# Patient Record
Sex: MALE | Race: WHITE | NOT HISPANIC OR LATINO
[De-identification: names, ages, dates, MRNs, and addresses within clinical notes are randomized per-mention and may not be internally consistent; named-entity substitution may affect disease eponyms.]

---

## 2017-02-05 ENCOUNTER — TRANSCRIPTION ENCOUNTER (OUTPATIENT)
Age: 77
End: 2017-02-05

## 2017-10-04 ENCOUNTER — EMERGENCY (EMERGENCY)
Facility: HOSPITAL | Age: 77
LOS: 1 days | Discharge: HOME | End: 2017-10-04
Attending: EMERGENCY MEDICINE | Admitting: EMERGENCY MEDICINE
Payer: MEDICARE

## 2017-10-04 VITALS
SYSTOLIC BLOOD PRESSURE: 127 MMHG | HEART RATE: 78 BPM | RESPIRATION RATE: 18 BRPM | DIASTOLIC BLOOD PRESSURE: 81 MMHG | OXYGEN SATURATION: 99 % | TEMPERATURE: 98 F

## 2017-10-04 DIAGNOSIS — Z88.1 ALLERGY STATUS TO OTHER ANTIBIOTIC AGENTS STATUS: ICD-10-CM

## 2017-10-04 DIAGNOSIS — S90.562A INSECT BITE (NONVENOMOUS), LEFT ANKLE, INITIAL ENCOUNTER: ICD-10-CM

## 2017-10-04 PROCEDURE — 99282 EMERGENCY DEPT VISIT SF MDM: CPT

## 2017-10-04 NOTE — ED PROVIDER NOTE - MEDICAL DECISION MAKING DETAILS
insect bite to anterior ankle left, itchy, no pain, today, will Rx bacitracin and recommend no scratching and follow up, return if not improving

## 2017-10-04 NOTE — ED PROVIDER NOTE - OBJECTIVE STATEMENT
insect bite to ankle, left, x 1 day, patient has window screens but they do not cover the whole window

## 2019-03-02 ENCOUNTER — EMERGENCY (EMERGENCY)
Facility: HOSPITAL | Age: 79
LOS: 1 days | Discharge: HOME | End: 2019-03-02
Admitting: EMERGENCY MEDICINE
Payer: MEDICARE

## 2019-03-02 VITALS
DIASTOLIC BLOOD PRESSURE: 71 MMHG | OXYGEN SATURATION: 98 % | SYSTOLIC BLOOD PRESSURE: 161 MMHG | HEART RATE: 76 BPM | RESPIRATION RATE: 18 BRPM | TEMPERATURE: 98 F

## 2019-03-02 VITALS
TEMPERATURE: 98 F | OXYGEN SATURATION: 98 % | RESPIRATION RATE: 18 BRPM | SYSTOLIC BLOOD PRESSURE: 149 MMHG | HEART RATE: 61 BPM | DIASTOLIC BLOOD PRESSURE: 68 MMHG

## 2019-03-02 DIAGNOSIS — R25.2 CRAMP AND SPASM: ICD-10-CM

## 2019-03-02 LAB
ALBUMIN SERPL ELPH-MCNC: 3.4 G/DL — SIGNIFICANT CHANGE UP (ref 3.4–5)
ALP SERPL-CCNC: 93 U/L — SIGNIFICANT CHANGE UP (ref 40–120)
ALT FLD-CCNC: 25 U/L — SIGNIFICANT CHANGE UP (ref 12–42)
ANION GAP SERPL CALC-SCNC: 6 MMOL/L — LOW (ref 9–16)
AST SERPL-CCNC: 22 U/L — SIGNIFICANT CHANGE UP (ref 15–37)
BASOPHILS NFR BLD AUTO: 0.8 % — SIGNIFICANT CHANGE UP (ref 0–2)
BILIRUB SERPL-MCNC: 0.7 MG/DL — SIGNIFICANT CHANGE UP (ref 0.2–1.2)
BUN SERPL-MCNC: 33 MG/DL — HIGH (ref 7–23)
CALCIUM SERPL-MCNC: 9.4 MG/DL — SIGNIFICANT CHANGE UP (ref 8.5–10.5)
CHLORIDE SERPL-SCNC: 105 MMOL/L — SIGNIFICANT CHANGE UP (ref 96–108)
CO2 SERPL-SCNC: 26 MMOL/L — SIGNIFICANT CHANGE UP (ref 22–31)
CREAT SERPL-MCNC: 1.05 MG/DL — SIGNIFICANT CHANGE UP (ref 0.5–1.3)
EOSINOPHIL NFR BLD AUTO: 4.4 % — SIGNIFICANT CHANGE UP (ref 0–6)
GLUCOSE SERPL-MCNC: 101 MG/DL — HIGH (ref 70–99)
HCT VFR BLD CALC: 39.3 % — SIGNIFICANT CHANGE UP (ref 39–50)
HGB BLD-MCNC: 12.5 G/DL — LOW (ref 13–17)
IMM GRANULOCYTES NFR BLD AUTO: 0.2 % — SIGNIFICANT CHANGE UP (ref 0–1.5)
LYMPHOCYTES # BLD AUTO: 11.6 % — LOW (ref 13–44)
MAGNESIUM SERPL-MCNC: 2.1 MG/DL — SIGNIFICANT CHANGE UP (ref 1.6–2.6)
MCHC RBC-ENTMCNC: 30.3 PG — SIGNIFICANT CHANGE UP (ref 27–34)
MCHC RBC-ENTMCNC: 31.8 G/DL — LOW (ref 32–36)
MCV RBC AUTO: 95.4 FL — SIGNIFICANT CHANGE UP (ref 80–100)
MONOCYTES NFR BLD AUTO: 12 % — SIGNIFICANT CHANGE UP (ref 2–14)
NEUTROPHILS NFR BLD AUTO: 71 % — SIGNIFICANT CHANGE UP (ref 43–77)
PLATELET # BLD AUTO: 208 K/UL — SIGNIFICANT CHANGE UP (ref 150–400)
POTASSIUM SERPL-MCNC: 4.4 MMOL/L — SIGNIFICANT CHANGE UP (ref 3.5–5.3)
POTASSIUM SERPL-SCNC: 4.4 MMOL/L — SIGNIFICANT CHANGE UP (ref 3.5–5.3)
PROT SERPL-MCNC: 6.8 G/DL — SIGNIFICANT CHANGE UP (ref 6.4–8.2)
RBC # BLD: 4.12 M/UL — LOW (ref 4.2–5.8)
RBC # FLD: 14.8 % — HIGH (ref 10.3–14.5)
SODIUM SERPL-SCNC: 137 MMOL/L — SIGNIFICANT CHANGE UP (ref 132–145)
WBC # BLD: 4.8 K/UL — SIGNIFICANT CHANGE UP (ref 3.8–10.5)
WBC # FLD AUTO: 4.8 K/UL — SIGNIFICANT CHANGE UP (ref 3.8–10.5)

## 2019-03-02 PROCEDURE — 99284 EMERGENCY DEPT VISIT MOD MDM: CPT

## 2019-03-02 RX ORDER — DIAZEPAM 5 MG
5 TABLET ORAL ONCE
Qty: 0 | Refills: 0 | Status: DISCONTINUED | OUTPATIENT
Start: 2019-03-02 | End: 2019-03-02

## 2019-03-02 RX ORDER — SODIUM CHLORIDE 9 MG/ML
1000 INJECTION INTRAMUSCULAR; INTRAVENOUS; SUBCUTANEOUS ONCE
Qty: 0 | Refills: 0 | Status: COMPLETED | OUTPATIENT
Start: 2019-03-02 | End: 2019-03-02

## 2019-03-02 RX ADMIN — SODIUM CHLORIDE 1000 MILLILITER(S): 9 INJECTION INTRAMUSCULAR; INTRAVENOUS; SUBCUTANEOUS at 15:54

## 2019-03-02 NOTE — ED PROVIDER NOTE - CLINICAL SUMMARY MEDICAL DECISION MAKING FREE TEXT BOX
pt. with muscle b/l LE muscle cramps, vss, no risk for electrolyte abnormality, vss, labs wnl. advised  to hydrate, and f/u with pmd.

## 2019-03-02 NOTE — ED ADULT NURSE NOTE - CHPI ED NUR SYMPTOMS NEG
no chills/no dizziness/no pain/no vomiting/no decreased eating/drinking/no nausea/no fever/no tingling/no weakness

## 2019-03-02 NOTE — ED PROVIDER NOTE - OBJECTIVE STATEMENT
79 yo M w/ history of alcohol abuse (currently sober, in AA) c/o b/l LE muscle cramping today. Pt notes prior episodes that resolve on its own, but states today's episode was more intense. Pt notes he drank a lot caffeinated tea last night; denies increased urination. No numbness/tingling, abrasion, swelling, redness. 77 yo M w/ history of alcohol abuse (currently sober, in AA) c/o b/l LE muscle cramping today. Pt notes prior episodes that resolve on its own, but states today's episode was more intense. Pt notes he drank a lot caffeinated tea last night; denies increased urination. No numbness/tingling, abrasion, swelling, redness.    Medications: flomax 77 yo M w/ history of alcohol abuse (currently sober, in AA) c/o b/l LE muscle cramping today. Pt notes prior episodes that resolve on its own, but states today's episode was more intense. Pt notes he drank a lot caffeinated tea last night; denies increased urination. No numbness/tingling,no N/v, no CP/SOB, no fever/chills.  abrasion, swelling, redness.    Medications: flomax

## 2019-03-02 NOTE — ED ADULT NURSE NOTE - OBJECTIVE STATEMENT
78 y.o M presents to ED with c/o bilateral lower leg/calf cramping this morning. PT reports pain has subsided. Denies any CP or SOB.

## 2019-03-06 DIAGNOSIS — M79.661 PAIN IN RIGHT LOWER LEG: ICD-10-CM

## 2019-03-06 DIAGNOSIS — M79.662 PAIN IN LEFT LOWER LEG: ICD-10-CM

## 2019-03-06 DIAGNOSIS — Z87.891 PERSONAL HISTORY OF NICOTINE DEPENDENCE: ICD-10-CM

## 2019-03-06 DIAGNOSIS — Z88.1 ALLERGY STATUS TO OTHER ANTIBIOTIC AGENTS STATUS: ICD-10-CM

## 2019-03-13 ENCOUNTER — EMERGENCY (EMERGENCY)
Facility: HOSPITAL | Age: 79
LOS: 1 days | Discharge: HOME | End: 2019-03-13
Attending: EMERGENCY MEDICINE | Admitting: EMERGENCY MEDICINE
Payer: MEDICARE

## 2019-03-13 VITALS
SYSTOLIC BLOOD PRESSURE: 179 MMHG | RESPIRATION RATE: 16 BRPM | TEMPERATURE: 98 F | OXYGEN SATURATION: 97 % | DIASTOLIC BLOOD PRESSURE: 78 MMHG | HEART RATE: 72 BPM

## 2019-03-13 PROCEDURE — 99284 EMERGENCY DEPT VISIT MOD MDM: CPT | Mod: 25

## 2019-03-13 PROCEDURE — 93010 ELECTROCARDIOGRAM REPORT: CPT

## 2019-03-14 ENCOUNTER — EMERGENCY (EMERGENCY)
Facility: HOSPITAL | Age: 79
LOS: 1 days | Discharge: HOME | End: 2019-03-14
Attending: EMERGENCY MEDICINE | Admitting: EMERGENCY MEDICINE
Payer: MEDICARE

## 2019-03-14 VITALS
OXYGEN SATURATION: 99 % | DIASTOLIC BLOOD PRESSURE: 76 MMHG | HEART RATE: 76 BPM | RESPIRATION RATE: 17 BRPM | SYSTOLIC BLOOD PRESSURE: 163 MMHG | TEMPERATURE: 98 F

## 2019-03-14 VITALS
OXYGEN SATURATION: 97 % | DIASTOLIC BLOOD PRESSURE: 76 MMHG | RESPIRATION RATE: 18 BRPM | TEMPERATURE: 98 F | SYSTOLIC BLOOD PRESSURE: 161 MMHG | HEART RATE: 69 BPM

## 2019-03-14 DIAGNOSIS — Z88.8 ALLERGY STATUS TO OTHER DRUGS, MEDICAMENTS AND BIOLOGICAL SUBSTANCES STATUS: ICD-10-CM

## 2019-03-14 DIAGNOSIS — M79.661 PAIN IN RIGHT LOWER LEG: ICD-10-CM

## 2019-03-14 DIAGNOSIS — Z79.899 OTHER LONG TERM (CURRENT) DRUG THERAPY: ICD-10-CM

## 2019-03-14 DIAGNOSIS — R25.2 CRAMP AND SPASM: ICD-10-CM

## 2019-03-14 PROBLEM — F10.21 ALCOHOL DEPENDENCE, IN REMISSION: Chronic | Status: ACTIVE | Noted: 2019-03-02

## 2019-03-14 LAB
ALBUMIN SERPL ELPH-MCNC: 3 G/DL — LOW (ref 3.4–5)
ALP SERPL-CCNC: 81 U/L — SIGNIFICANT CHANGE UP (ref 40–120)
ALT FLD-CCNC: 24 U/L — SIGNIFICANT CHANGE UP (ref 12–42)
ANION GAP SERPL CALC-SCNC: 7 MMOL/L — LOW (ref 9–16)
AST SERPL-CCNC: 22 U/L — SIGNIFICANT CHANGE UP (ref 15–37)
BASOPHILS NFR BLD AUTO: 0.5 % — SIGNIFICANT CHANGE UP (ref 0–2)
BILIRUB SERPL-MCNC: 0.4 MG/DL — SIGNIFICANT CHANGE UP (ref 0.2–1.2)
BUN SERPL-MCNC: 29 MG/DL — HIGH (ref 7–23)
CALCIUM SERPL-MCNC: 9.3 MG/DL — SIGNIFICANT CHANGE UP (ref 8.5–10.5)
CHLORIDE SERPL-SCNC: 107 MMOL/L — SIGNIFICANT CHANGE UP (ref 96–108)
CK SERPL-CCNC: 94 U/L — SIGNIFICANT CHANGE UP (ref 39–308)
CO2 SERPL-SCNC: 29 MMOL/L — SIGNIFICANT CHANGE UP (ref 22–31)
CREAT SERPL-MCNC: 1.04 MG/DL — SIGNIFICANT CHANGE UP (ref 0.5–1.3)
D DIMER BLD IA.RAPID-MCNC: 245 NG/ML DDU — HIGH
EOSINOPHIL NFR BLD AUTO: 3.8 % — SIGNIFICANT CHANGE UP (ref 0–6)
GLUCOSE SERPL-MCNC: 102 MG/DL — HIGH (ref 70–99)
HCT VFR BLD CALC: 35.3 % — LOW (ref 39–50)
HGB BLD-MCNC: 11.5 G/DL — LOW (ref 13–17)
IMM GRANULOCYTES NFR BLD AUTO: 0.2 % — SIGNIFICANT CHANGE UP (ref 0–1.5)
LYMPHOCYTES # BLD AUTO: 11.1 % — LOW (ref 13–44)
MAGNESIUM SERPL-MCNC: 2.1 MG/DL — SIGNIFICANT CHANGE UP (ref 1.6–2.6)
MCHC RBC-ENTMCNC: 30.9 PG — SIGNIFICANT CHANGE UP (ref 27–34)
MCHC RBC-ENTMCNC: 32.6 G/DL — SIGNIFICANT CHANGE UP (ref 32–36)
MCV RBC AUTO: 94.9 FL — SIGNIFICANT CHANGE UP (ref 80–100)
MONOCYTES NFR BLD AUTO: 10.4 % — SIGNIFICANT CHANGE UP (ref 2–14)
NEUTROPHILS NFR BLD AUTO: 74 % — SIGNIFICANT CHANGE UP (ref 43–77)
PLATELET # BLD AUTO: 162 K/UL — SIGNIFICANT CHANGE UP (ref 150–400)
POTASSIUM SERPL-MCNC: 4.5 MMOL/L — SIGNIFICANT CHANGE UP (ref 3.5–5.3)
POTASSIUM SERPL-SCNC: 4.5 MMOL/L — SIGNIFICANT CHANGE UP (ref 3.5–5.3)
PROT SERPL-MCNC: 6.7 G/DL — SIGNIFICANT CHANGE UP (ref 6.4–8.2)
RBC # BLD: 3.72 M/UL — LOW (ref 4.2–5.8)
RBC # FLD: 14.8 % — HIGH (ref 10.3–14.5)
SODIUM SERPL-SCNC: 143 MMOL/L — SIGNIFICANT CHANGE UP (ref 132–145)
TROPONIN I SERPL-MCNC: <0.017 NG/ML — LOW (ref 0.02–0.06)
TSH SERPL-MCNC: 1.21 UIU/ML — SIGNIFICANT CHANGE UP (ref 0.36–3.74)
WBC # BLD: 4.4 K/UL — SIGNIFICANT CHANGE UP (ref 3.8–10.5)
WBC # FLD AUTO: 4.4 K/UL — SIGNIFICANT CHANGE UP (ref 3.8–10.5)

## 2019-03-14 PROCEDURE — 93970 EXTREMITY STUDY: CPT | Mod: 26

## 2019-03-14 PROCEDURE — 99284 EMERGENCY DEPT VISIT MOD MDM: CPT

## 2019-03-14 RX ORDER — SODIUM CHLORIDE 9 MG/ML
500 INJECTION INTRAMUSCULAR; INTRAVENOUS; SUBCUTANEOUS ONCE
Qty: 0 | Refills: 0 | Status: COMPLETED | OUTPATIENT
Start: 2019-03-14 | End: 2019-03-14

## 2019-03-14 RX ORDER — CYCLOBENZAPRINE HYDROCHLORIDE 10 MG/1
10 TABLET, FILM COATED ORAL ONCE
Qty: 0 | Refills: 0 | Status: COMPLETED | OUTPATIENT
Start: 2019-03-14 | End: 2019-03-14

## 2019-03-14 RX ORDER — ASPIRIN/CALCIUM CARB/MAGNESIUM 324 MG
162 TABLET ORAL ONCE
Qty: 0 | Refills: 0 | Status: COMPLETED | OUTPATIENT
Start: 2019-03-14 | End: 2019-03-14

## 2019-03-14 RX ORDER — THIAMINE MONONITRATE (VIT B1) 100 MG
1 TABLET ORAL
Qty: 30 | Refills: 0 | OUTPATIENT
Start: 2019-03-14 | End: 2019-04-12

## 2019-03-14 RX ORDER — FOLIC ACID 0.8 MG
1 TABLET ORAL DAILY
Qty: 0 | Refills: 0 | Status: DISCONTINUED | OUTPATIENT
Start: 2019-03-14 | End: 2019-03-17

## 2019-03-14 RX ORDER — ASPIRIN/CALCIUM CARB/MAGNESIUM 324 MG
162 TABLET ORAL ONCE
Qty: 0 | Refills: 0 | Status: DISCONTINUED | OUTPATIENT
Start: 2019-03-14 | End: 2019-03-14

## 2019-03-14 RX ORDER — FOLIC ACID 0.8 MG
1 TABLET ORAL
Qty: 30 | Refills: 0
Start: 2019-03-14 | End: 2019-04-12

## 2019-03-14 RX ORDER — THIAMINE MONONITRATE (VIT B1) 100 MG
100 TABLET ORAL ONCE
Qty: 0 | Refills: 0 | Status: COMPLETED | OUTPATIENT
Start: 2019-03-14 | End: 2019-03-14

## 2019-03-14 RX ORDER — CYCLOBENZAPRINE HYDROCHLORIDE 10 MG/1
1 TABLET, FILM COATED ORAL
Qty: 15 | Refills: 0
Start: 2019-03-14 | End: 2019-03-18

## 2019-03-14 RX ADMIN — SODIUM CHLORIDE 1500 MILLILITER(S): 9 INJECTION INTRAMUSCULAR; INTRAVENOUS; SUBCUTANEOUS at 03:23

## 2019-03-14 RX ADMIN — CYCLOBENZAPRINE HYDROCHLORIDE 10 MILLIGRAM(S): 10 TABLET, FILM COATED ORAL at 02:41

## 2019-03-14 RX ADMIN — Medication 100 MILLIGRAM(S): at 03:22

## 2019-03-14 RX ADMIN — Medication 1 MILLIGRAM(S): at 03:22

## 2019-03-14 RX ADMIN — Medication 162 MILLIGRAM(S): at 01:43

## 2019-03-14 RX ADMIN — SODIUM CHLORIDE 1500 MILLILITER(S): 9 INJECTION INTRAMUSCULAR; INTRAVENOUS; SUBCUTANEOUS at 01:39

## 2019-03-14 NOTE — ED PROVIDER NOTE - OBJECTIVE STATEMENT
78 yom pw bl leg cramp, ongoing for days, seen here yesterday, rec'd to return for US eval for dvt, noted elevated dimer from yesterday, but no obvious metabolic derangement.  pt states he slept in and missed the hours of pharmacy to  his meds.  leg cramp is bl, intermittent, no modifying or eliciting factors.

## 2019-03-14 NOTE — ED PROVIDER NOTE - OBJECTIVE STATEMENT
Patient with hx of ex etoh abuse, hx pacemaker, currently only takes finasteride, presents with recurrence of leg cramps, which keep him up at night. notes tingling and pins and needles to bilateral calves, associated with cramping to posterior legs, nonradiating. notes he was seen in the ED recently for similar, and was told to return for a doppler. denies cp, sob, denies abd pain, denies syncope. denies recent trauma. notes he is on his feet all day. denies falls. denies weakness to legs, denies groin pain or testicular symptoms. notes he is not taking anything for the symptoms. Patient with hx of ex etoh abuse 7 yrs clean, attends AA, denilson pacemaker, currently only takes finasteride, presents with recurrence of leg cramps, which keep him up at night. notes tingling and pins and needles to bilateral calves, associated with cramping to posterior legs, nonradiating. notes he was seen in the ED recently for similar, and was told to return for a doppler. no relation to exertion. denies cp, sob, denies abd pain, denies syncope. denies recent trauma. notes he is on his feet all day. denies falls. denies weakness to legs, denies groin pain or testicular symptoms. notes he is not taking anything for the symptoms.

## 2019-03-14 NOTE — ED PROVIDER NOTE - PROGRESS NOTE DETAILS
pt does not like flexiril, will switch to baclofen.  I dc'd flexiril prescription.  also explained to pt that baclofen may cause drowsiness

## 2019-03-14 NOTE — ED PROVIDER NOTE - NSFOLLOWUPINSTRUCTIONS_ED_ALL_ED_FT
Follow up with your primary care doctor or clinics listed below  If you have difficulty scheduling an appointment with your doctor, you can also schedule an appointment with the clinics:  [place] [id number] [place] [address] [id number] [place] [address]  Address: [address] [place] [id number]   Return immediately for any new or worsening symptoms or any new concerns

## 2019-03-14 NOTE — ED PROVIDER NOTE - CLINICAL SUMMARY MEDICAL DECISION MAKING FREE TEXT BOX
will rule out electrolyte deficiency, patient is ex etoh use, and currently not using, ambulatory and not ataxic, give thiamine, folate and asa in ed. do not suspect dvt, or vascular compromise, as patient has no palpable cords, edema, sensation and motor is intact and vascular pulses are equal and intact bilaterally. patient otherwise nontoxic appearing. will advise to return in am for dedicated doppler. possibly this is secondary to claudication. reassess after labs for possible CTA lower extremities. will rule out electrolyte deficiency, patient is ex etoh use, and currently not using, ambulatory and not ataxic, give thiamine, folate and asa in ed. do not suspect dvt, or vascular compromise, as patient has no palpable cords, edema, sensation and motor is intact and vascular pulses are equal and intact bilaterally. patient otherwise nontoxic appearing. will advise to return in am for dedicated doppler. possibly this is secondary to claudication. but symptoms have no relation to exertion.  patient has follow up with pmd in beginning of april. will rule out electrolyte deficiency, patient is ex etoh use, and currently not using, ambulatory and not ataxic, give thiamine, folate and asa in ed. do not suspect dvt, or vascular compromise, as patient has no palpable cords, edema, sensation and motor is intact and vascular pulses are equal and intact bilaterally. patient otherwise nontoxic appearing. will advise to return in am for dedicated doppler. possibly this is secondary to claudication. but symptoms have no relation to exertion.  patient has follow up with pmd in beginning of april. advised baby asa, and thiamine and folate.

## 2019-03-14 NOTE — ED ADULT NURSE NOTE - NSIMPLEMENTINTERV_GEN_ALL_ED
Implemented All Universal Safety Interventions:  [place] to call system. Call bell, personal items and telephone within reach. Instruct patient to call for assistance. Room bathroom lighting operational. Non-slip footwear when patient is off stretcher. Physically safe environment: no spills, clutter or unnecessary equipment. Stretcher in lowest position, wheels locked, appropriate side rails in place.

## 2019-03-14 NOTE — ED ADULT NURSE NOTE - NSIMPLEMENTINTERV_GEN_ALL_ED
Implemented All Universal Safety Interventions:  [place] to call system. Call bell, personal items and telephone within reach. Instruct patient to call for assistance. Room bathroom lighting operational. Non-slip footwear when patient is off stretcher. Physically safe environment: no spills, clutter or unnecessary equipment. Stretcher in lowest position, wheels locked, appropriate side rails in place.

## 2019-03-14 NOTE — ED PROVIDER NOTE - PHYSICAL EXAMINATION
CON: ao x 3, HENMT: clear oropharynx, soft neck, HEAD: atraumatic, SKIN: no rash, no erythema, no fluctuance, no induration, no bullae/vesicles, no palpable cord, MSK: no obvious unilateral swelling or tenderness, no edema, soft compartment bl, full ROM of bl LE

## 2019-03-14 NOTE — ED PROVIDER NOTE - CLINICAL SUMMARY MEDICAL DECISION MAKING FREE TEXT BOX
noted bl leg pain/cramp, elevated dimer from last visit, will obtain duplex, labs from yesterday reviewed, exam no obvious compartment syndrome or acute ischemic limb or acute infectious process

## 2019-03-14 NOTE — ED ADULT TRIAGE NOTE - CHIEF COMPLAINT QUOTE
Pt complaining of right leg cramp which has since resolved. Pt states that he was seen last night for the same reason and was discharged home to follow up with PCP and  medications. Pt states that he did not  medications and was unable to see PCP today.

## 2019-03-15 RX ORDER — BACLOFEN 100 %
1 POWDER (GRAM) MISCELLANEOUS
Qty: 7 | Refills: 0
Start: 2019-03-15 | End: 2019-03-21

## 2019-03-17 DIAGNOSIS — Z88.1 ALLERGY STATUS TO OTHER ANTIBIOTIC AGENTS STATUS: ICD-10-CM

## 2019-03-17 DIAGNOSIS — R20.2 PARESTHESIA OF SKIN: ICD-10-CM

## 2019-03-17 DIAGNOSIS — M79.661 PAIN IN RIGHT LOWER LEG: ICD-10-CM

## 2019-03-17 DIAGNOSIS — M79.662 PAIN IN LEFT LOWER LEG: ICD-10-CM

## 2019-03-17 DIAGNOSIS — Z95.0 PRESENCE OF CARDIAC PACEMAKER: ICD-10-CM

## 2019-03-17 DIAGNOSIS — R25.2 CRAMP AND SPASM: ICD-10-CM

## 2019-07-29 ENCOUNTER — EMERGENCY (EMERGENCY)
Facility: HOSPITAL | Age: 79
LOS: 1 days | Discharge: HOME | End: 2019-07-29
Admitting: EMERGENCY MEDICINE
Payer: MEDICARE

## 2019-07-29 VITALS
OXYGEN SATURATION: 95 % | DIASTOLIC BLOOD PRESSURE: 73 MMHG | HEART RATE: 69 BPM | SYSTOLIC BLOOD PRESSURE: 138 MMHG | RESPIRATION RATE: 18 BRPM | TEMPERATURE: 98 F

## 2019-07-29 PROCEDURE — 99282 EMERGENCY DEPT VISIT SF MDM: CPT

## 2019-07-29 NOTE — ED ADULT TRIAGE NOTE - CHIEF COMPLAINT QUOTE
Pt complaining of cold like symptoms x few days and inguinal hernia pain x 3 weeks. Pt denies N/V/D, chest pain and sob.

## 2019-07-30 VITALS
RESPIRATION RATE: 18 BRPM | TEMPERATURE: 98 F | OXYGEN SATURATION: 100 % | DIASTOLIC BLOOD PRESSURE: 64 MMHG | HEART RATE: 62 BPM | SYSTOLIC BLOOD PRESSURE: 122 MMHG

## 2019-07-30 NOTE — ED PROVIDER NOTE - CARE PROVIDER_API CALL
Yanick Rodriguez)  ColonRectal Surgery; Surgery  [address] [facility], [address] [place] [address]  Phone: [phone]  Fax: [phone]  Follow Up Time:     Savita Valerio)  Surgery  [address] [id number]  Phone: [phone]  Fax: [phone]  Follow Up Time:

## 2019-07-30 NOTE — ED PROVIDER NOTE - OBJECTIVE STATEMENT
78 y/o Male with a PMHx of a colon resection s/p cancerous tumor, and enlarged prostate on Flomax presents to the ED c/o a right sided hernia x1 month. Pt states hernia pain is exacerbated with movement and is intermittent. He has been able to reduce the bulge but temporarily. Last BM was sometime today and has passing gas. Denies N/V/D, fever, and chills. Pt had a left inguinal hernia repair 7 years ago by Dr. Yanick Rodriguez. 80 y/o Male with a PMHx of a colon resection s/p cancerous tumor, and enlarged prostate on Flomax, hx left inguinal hernia repair, presents to the ED c/o a right sided inguinal hernia x1 month. Pt states hernia pain is exacerbated with movement and is intermittent, patient states he is able to reduce on his own. No vomiting. No abdominal pain. No fever. Last BM was sometime today and has been passing flatus. Denies N/V/D, fever, and chills. Pt had a left inguinal hernia repair 7 years ago by Dr. Yanick Rodriguez. patient is asking contact info for Dr. Rodriguez to follow up with him for inguinal hernia repair.

## 2019-07-30 NOTE — ED PROVIDER NOTE - CLINICAL SUMMARY MEDICAL DECISION MAKING FREE TEXT BOX
left inguinal hernia, hernia reducible nontender ,no signs of strangulation, tolerating PO. no vomiting. will give follow up with gen surg. strict return precautions discussed.

## 2019-07-30 NOTE — ED PROVIDER NOTE - PHYSICAL EXAMINATION
VITAL SIGNS: I have reviewed nursing notes and confirm.  CONSTITUTIONAL: Well-developed; well-nourished; in no acute distress.  SKIN: Skin is warm and dry, no acute rash.  HEAD: Normocephalic; atraumatic.  EYES: sclera clear.  ENT: No nasal discharge; airway clear.  ABD: Normal bowel sounds; soft; non-distended; non-tender. Positive right inguinal hernia, reproducible. non-tender, and no erythema   EXT: Normal ROM. No clubbing, cyanosis or edema.  NEURO: Alert, oriented. Grossly unremarkable.  PSYCH: Cooperative, appropriate. VITAL SIGNS: I have reviewed nursing notes and confirm.  CONSTITUTIONAL: Well-developed; well-nourished; in no acute distress.  SKIN: Skin is warm and dry, no acute rash.  HEAD: Normocephalic; atraumatic.  EYES: clear bilaterally  ENT: No nasal discharge; airway clear.  ABD:soft; non-distended; non-tender. right inguinal hernia, reproducible. non-tender, and no erythema   EXT: Normal ROM. No clubbing, cyanosis or edema.  NEURO: Alert, oriented. Grossly unremarkable.  PSYCH: Cooperative, appropriate.

## 2019-07-30 NOTE — ED ADULT NURSE NOTE - OBJECTIVE STATEMENT
Pt is a 79y male complaining of inguinal hernia pain x 1 month. Pt denies nausea, vomiting, and diarrhea.

## 2019-07-30 NOTE — ED PROVIDER NOTE - NSFOLLOWUPINSTRUCTIONS_ED_ALL_ED_FT
Please follow up with general surgeon    RETURN TO THE EMERGENCY DEPARTMENT FOR WORSENING PAIN, VOMITING, FEVER, INABILITY TO REDUCE HERNIA, UNABLE TO PASS BOWEL MOVEMENT OR FLATUS OR ANY CONCERNS.

## 2019-08-03 DIAGNOSIS — K40.90 UNILATERAL INGUINAL HERNIA, WITHOUT OBSTRUCTION OR GANGRENE, NOT SPECIFIED AS RECURRENT: ICD-10-CM

## 2019-08-03 DIAGNOSIS — Z88.1 ALLERGY STATUS TO OTHER ANTIBIOTIC AGENTS STATUS: ICD-10-CM

## 2019-08-06 PROBLEM — Z00.00 ENCOUNTER FOR PREVENTIVE HEALTH EXAMINATION: Status: ACTIVE | Noted: 2019-08-06

## 2019-08-07 ENCOUNTER — EMERGENCY (EMERGENCY)
Facility: HOSPITAL | Age: 79
LOS: 1 days | Discharge: HOME | End: 2019-08-07
Admitting: EMERGENCY MEDICINE
Payer: MEDICARE

## 2019-08-07 VITALS
HEART RATE: 72 BPM | DIASTOLIC BLOOD PRESSURE: 73 MMHG | SYSTOLIC BLOOD PRESSURE: 124 MMHG | RESPIRATION RATE: 16 BRPM | OXYGEN SATURATION: 98 % | TEMPERATURE: 98 F

## 2019-08-07 PROCEDURE — 93971 EXTREMITY STUDY: CPT | Mod: 26,RT

## 2019-08-07 PROCEDURE — 99284 EMERGENCY DEPT VISIT MOD MDM: CPT

## 2019-08-07 NOTE — ED PROVIDER NOTE - CLINICAL SUMMARY MEDICAL DECISION MAKING FREE TEXT BOX
78 y/o M presents to ED c/o L hand pain, R calf pain and states his hernia is getting better since last visit.  Pt well appearing.  VSS.  Hernia easily reduces.  NO concern for strangulation/incarceration.  DVT u/s of R let negative.  R leg is not edematous with soft compartments.  L hand is a normal exam.  Pt advised to f/u with PCP and Dr. Navarrete, general surgery as scheduled.  Return precautions advised.

## 2019-08-07 NOTE — ED PROVIDER NOTE - MUSCULOSKELETAL, MLM
Spine appears normal, range of motion is not limited, no muscle or joint tenderness.  FROM of R hand, no TTP, NVI

## 2019-08-07 NOTE — ED PROVIDER NOTE - NSFOLLOWUPINSTRUCTIONS_ED_ALL_ED_FT
Follow up with primary care and general surgery as scheduled.    Return for increased pain, swelling, fever or other concerns.

## 2019-08-07 NOTE — ED PROVIDER NOTE - CHPI ED SYMPTOMS NEG
no vomiting/no dizziness/no nausea/no tingling/no fever/no numbness/no abdominal pain, no CP, no SOB./no weakness/no chills

## 2019-08-07 NOTE — ED PROVIDER NOTE - RESPIRATORY, MLM
Breath sounds clear and equal bilaterally.  R hernia easily reduces with laying flat or gentle palpation

## 2019-08-07 NOTE — ED PROVIDER NOTE - OBJECTIVE STATEMENT
79 y.o Male with a PMHx of a colon resection s/p cancerous tumor, and enlarged prostate on Flomax, hx left inguinal hernia repair, presents to the ED with multiple medical complaints.   Pt reports right calf pain for the past 2 days. He describes his calf to be "sore and stiff." He attempted to stretch his calf out yesterday with no relief of pain. Denies any recent long drives or flights.   Pt also c/o left hand pain after banging his hand on a table approximately 2 months ago. Denies taking any pain medication for his hand. Denies weakness, numbness or tingling the extremity.   Pt also c/o right inguinal hernia pain worsening in the past week. States he can sometimes feel pain from the hernia with certain positional changes. He notes having the hernia for approximately a month and a half. Notes hernia is reducible.  Pt was seen here a couple of weeks ago for similar complaint. He has an appointment with General Surgeon Dr. Db Navarrete on August 19th for this complaint. Denies fever, chills, N/V, abdominal pain, CP, SOB, dizziness.

## 2019-08-07 NOTE — ED ADULT TRIAGE NOTE - CHIEF COMPLAINT QUOTE
patient here with right calf pain x2 days; denies any flights or long car drives; also c/o right inguinal hernia pain worse x1 week; has appointment with surgeon (Db Finn) on August 19th; also c/o left hand pain x2 months after hitting it

## 2019-08-08 NOTE — ED ADULT NURSE NOTE - NSIMPLEMENTINTERV_GEN_ALL_ED
Implemented All Universal Safety Interventions:  [place] to call system. Call bell, personal items and telephone within reach. Instruct patient to call for assistance. Room bathroom lighting operational. Non-slip footwear when patient is off stretcher. Physically safe environment: no spills, clutter or unnecessary equipment. Stretcher in lowest position, wheels locked, appropriate side rails in place.

## 2019-08-11 DIAGNOSIS — M79.642 PAIN IN LEFT HAND: ICD-10-CM

## 2019-08-11 DIAGNOSIS — M79.661 PAIN IN RIGHT LOWER LEG: ICD-10-CM

## 2019-08-11 DIAGNOSIS — K40.90 UNILATERAL INGUINAL HERNIA, WITHOUT OBSTRUCTION OR GANGRENE, NOT SPECIFIED AS RECURRENT: ICD-10-CM

## 2019-08-19 ENCOUNTER — APPOINTMENT (OUTPATIENT)
Dept: SURGERY | Facility: CLINIC | Age: 79
End: 2019-08-19
Payer: MEDICARE

## 2019-08-19 VITALS
WEIGHT: 142.25 LBS | HEART RATE: 66 BPM | SYSTOLIC BLOOD PRESSURE: 129 MMHG | BODY MASS INDEX: 18.85 KG/M2 | HEIGHT: 73 IN | DIASTOLIC BLOOD PRESSURE: 69 MMHG | TEMPERATURE: 96.9 F | OXYGEN SATURATION: 98 %

## 2019-08-19 DIAGNOSIS — Z87.19 OTHER SPECIFIED POSTPROCEDURAL STATES: ICD-10-CM

## 2019-08-19 DIAGNOSIS — Z98.890 OTHER SPECIFIED POSTPROCEDURAL STATES: ICD-10-CM

## 2019-08-19 DIAGNOSIS — K40.90 UNILATERAL INGUINAL HERNIA, W/OUT OBSTRUCTION OR GANGRENE, NOT SPECIFIED AS RECURRENT: ICD-10-CM

## 2019-08-19 DIAGNOSIS — Z90.49 ACQUIRED ABSENCE OF OTHER SPECIFIED PARTS OF DIGESTIVE TRACT: ICD-10-CM

## 2019-08-19 PROCEDURE — 99203 OFFICE O/P NEW LOW 30 MIN: CPT

## 2019-08-28 PROBLEM — Z98.890 HISTORY OF LEFT INGUINAL HERNIA REPAIR: Status: ACTIVE | Noted: 2019-08-19

## 2019-08-28 PROBLEM — K40.90 RIGHT INGUINAL HERNIA: Status: ACTIVE | Noted: 2019-08-19

## 2019-10-28 ENCOUNTER — APPOINTMENT (OUTPATIENT)
Dept: COLORECTAL SURGERY | Facility: CLINIC | Age: 79
End: 2019-10-28
Payer: MEDICARE

## 2019-10-28 VITALS
TEMPERATURE: 98.4 F | HEIGHT: 73 IN | DIASTOLIC BLOOD PRESSURE: 86 MMHG | HEART RATE: 60 BPM | WEIGHT: 148 LBS | SYSTOLIC BLOOD PRESSURE: 164 MMHG | BODY MASS INDEX: 19.61 KG/M2

## 2019-10-28 DIAGNOSIS — Z80.3 FAMILY HISTORY OF MALIGNANT NEOPLASM OF BREAST: ICD-10-CM

## 2019-10-28 DIAGNOSIS — K59.09 OTHER CONSTIPATION: ICD-10-CM

## 2019-10-28 DIAGNOSIS — C18.9 MALIGNANT NEOPLASM OF COLON, UNSPECIFIED: ICD-10-CM

## 2019-10-28 DIAGNOSIS — Z72.89 OTHER PROBLEMS RELATED TO LIFESTYLE: ICD-10-CM

## 2019-10-28 DIAGNOSIS — R73.9 HYPERGLYCEMIA, UNSPECIFIED: ICD-10-CM

## 2019-10-28 DIAGNOSIS — Z12.11 ENCOUNTER FOR SCREENING FOR MALIGNANT NEOPLASM OF COLON: ICD-10-CM

## 2019-10-28 DIAGNOSIS — Z86.19 PERSONAL HISTORY OF OTHER INFECTIOUS AND PARASITIC DISEASES: ICD-10-CM

## 2019-10-28 DIAGNOSIS — I51.9 HEART DISEASE, UNSPECIFIED: ICD-10-CM

## 2019-10-28 DIAGNOSIS — Z87.891 PERSONAL HISTORY OF NICOTINE DEPENDENCE: ICD-10-CM

## 2019-10-28 PROCEDURE — 99202 OFFICE O/P NEW SF 15 MIN: CPT

## 2019-10-28 RX ORDER — ASCORBIC ACID 500 MG
TABLET ORAL
Refills: 0 | Status: ACTIVE | COMMUNITY

## 2019-10-28 RX ORDER — TAMSULOSIN HYDROCHLORIDE 0.4 MG/1
CAPSULE ORAL
Refills: 0 | Status: ACTIVE | COMMUNITY

## 2019-10-28 NOTE — HISTORY OF PRESENT ILLNESS
[FreeTextEntry1] : 80 yo M w/ PMH ascending colon cancer, s/p resection w/ Dr. Jay Mittal approx. 2012 presents today for colonoscopy consult\par Reports Dr. Mittal recently relocated to [facility]. Patient unsure if he completed imaging afterwards.\par Denies hx of CRT\par \par Pt evaluated by Dr. Navarrete 8/19/19 for right inguinal hernia repair, patient due for colonoscopy, referred to this office for scheduling\par Reports occasional constipation, takes ?Miralax, stool softener and senna as needed w/ good effect.\par Denies fever, weight loss, BPR. Appetite and energy levels otherwise good.\par BH: 3 times w/ straining. Occasional incomplete emptying\par Reports daily intake fruits/veggies\par Last colonoscopy approx. 2013, advised diverticulosis, otherwise normal\par

## 2019-10-28 NOTE — PHYSICAL EXAM
[Abdomen Masses] : No abdominal masses [Abdomen Tenderness] : ~T No ~M abdominal tenderness [No HSM] : no hepatosplenomegaly [JVD] : jugular venous distention ~L [Normal Breath Sounds] : Normal breath sounds [Normal Heart Sounds] : normal heart sounds [No Rash or Lesion] : No rash or lesion

## 2019-11-15 RX ORDER — POLYETHYLENE GLYCOL 3350 17 G/17G
17 POWDER, FOR SOLUTION ORAL
Qty: 238 | Refills: 0 | Status: ACTIVE | COMMUNITY
Start: 2019-11-15 | End: 1900-01-01

## 2019-11-18 ENCOUNTER — APPOINTMENT (OUTPATIENT)
Dept: GASTROENTEROLOGY | Facility: CLINIC | Age: 79
End: 2019-11-18

## 2019-11-27 ENCOUNTER — APPOINTMENT (OUTPATIENT)
Dept: COLORECTAL SURGERY | Facility: CLINIC | Age: 79
End: 2019-11-27
Payer: MEDICARE

## 2019-11-27 PROCEDURE — 45380 COLONOSCOPY AND BIOPSY: CPT

## 2020-08-26 ENCOUNTER — EMERGENCY (EMERGENCY)
Facility: HOSPITAL | Age: 80
LOS: 1 days | Discharge: HOME | End: 2020-08-26
Admitting: EMERGENCY MEDICINE
Payer: MEDICARE

## 2020-08-26 VITALS
OXYGEN SATURATION: 98 % | SYSTOLIC BLOOD PRESSURE: 143 MMHG | WEIGHT: 147.05 LBS | RESPIRATION RATE: 18 BRPM | HEART RATE: 70 BPM | DIASTOLIC BLOOD PRESSURE: 76 MMHG | TEMPERATURE: 98 F

## 2020-08-26 VITALS
OXYGEN SATURATION: 99 % | SYSTOLIC BLOOD PRESSURE: 165 MMHG | HEART RATE: 60 BPM | DIASTOLIC BLOOD PRESSURE: 83 MMHG | TEMPERATURE: 98 F | RESPIRATION RATE: 16 BRPM
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
## 2020-08-26 DIAGNOSIS — R31.9 HEMATURIA, UNSPECIFIED: ICD-10-CM

## 2020-08-26 DIAGNOSIS — T83.098A OTHER MECHANICAL COMPLICATION OF OTHER URINARY CATHETER, INITIAL ENCOUNTER: ICD-10-CM

## 2020-08-26 DIAGNOSIS — R33.9 RETENTION OF URINE, UNSPECIFIED: ICD-10-CM

## 2020-08-26 DIAGNOSIS — Z87.891 PERSONAL HISTORY OF NICOTINE DEPENDENCE: ICD-10-CM

## 2020-08-26 DIAGNOSIS — Z88.1 ALLERGY STATUS TO OTHER ANTIBIOTIC AGENTS STATUS: ICD-10-CM

## 2020-08-26 DIAGNOSIS — Z95.0 PRESENCE OF CARDIAC PACEMAKER: Chronic | ICD-10-CM

## 2020-08-26 LAB
APPEARANCE UR: CLEAR — SIGNIFICANT CHANGE UP
BILIRUB UR-MCNC: NEGATIVE — SIGNIFICANT CHANGE UP
COLOR SPEC: YELLOW — SIGNIFICANT CHANGE UP
DIFF PNL FLD: ABNORMAL
GLUCOSE UR QL: NEGATIVE — SIGNIFICANT CHANGE UP
KETONES UR-MCNC: NEGATIVE — SIGNIFICANT CHANGE UP
LEUKOCYTE ESTERASE UR-ACNC: ABNORMAL
NITRITE UR-MCNC: NEGATIVE — SIGNIFICANT CHANGE UP
PH UR: 6.5 — SIGNIFICANT CHANGE UP (ref 5–8)
PROT UR-MCNC: 100 MG/DL
SP GR SPEC: 1.02 — SIGNIFICANT CHANGE UP (ref 1–1.03)
UROBILINOGEN FLD QL: 0.2 E.U./DL — SIGNIFICANT CHANGE UP

## 2020-08-26 PROCEDURE — 99284 EMERGENCY DEPT VISIT MOD MDM: CPT

## 2020-08-26 PROCEDURE — 76857 US EXAM PELVIC LIMITED: CPT | Mod: 26

## 2020-08-26 RX ORDER — LIDOCAINE HCL 20 MG/ML
5 VIAL (ML) INJECTION ONCE
Refills: 0 | Status: DISCONTINUED | OUTPATIENT
Start: 2020-08-26 | End: 2020-08-30

## 2020-08-26 RX ORDER — CEFUROXIME AXETIL 250 MG
250 TABLET ORAL ONCE
Refills: 0 | Status: COMPLETED | OUTPATIENT
Start: 2020-08-26 | End: 2020-08-26

## 2020-08-26 RX ORDER — CEFUROXIME AXETIL 250 MG
1 TABLET ORAL
Qty: 14 | Refills: 0
Start: 2020-08-26 | End: 2020-09-01

## 2020-08-26 RX ADMIN — Medication 250 MILLIGRAM(S): at 23:44

## 2020-08-26 NOTE — ED PROVIDER NOTE - PHYSICAL EXAMINATION
Gen - WDWN elderly M, NAD, comfortable and non-toxic appearing  Skin - warm, dry, intact   HEENT - AT/NC, airway patent, neck supple   CV - S1S2, R/R/R  Resp - CTAB, no r/r/w  GI - soft, ND, NT, no CVAT b/l    - 16 fr blanco in place with daja hematuria and few small clots in the leg bag, no urethral dc or bleeding   MS - w/w/p, no c/c/e  Neuro - AxOx3, ambulatory without gait disturbance

## 2020-08-26 NOTE — ED PROVIDER NOTE - PROVIDER TOKENS
FREE:[LAST:[please follow up with your private urologist],PHONE:[(   )    -],FAX:[(   )    -]],PROVIDER:[TOKEN:[[id number]]]

## 2020-08-26 NOTE — ED PROVIDER NOTE - PATIENT PORTAL LINK FT
You can access the FollowMyHealth Patient Portal offered by [facility] by registering at the following website: [email or web address]. By joining [employer]’s FollowMyHealth portal, you will also be able to view your health information using other applications (apps) compatible with our system.

## 2020-08-26 NOTE — ED ADULT TRIAGE NOTE - HEIGHT IN FEET
Pt c/o abd pain, N/V/D, since last night and R flank pain with urinary frequency. denies dysuria, hematuria, fever/chills. Denies pmhx. 6

## 2020-08-26 NOTE — ED PROVIDER NOTE - CARE PROVIDER_API CALL
please follow up with your private urologist,   Phone: (   )    -  Fax: (   )    -  Follow Up Time:     Anders Stroud)  Urology  [address], [facility] [place] [address]  Phone: [phone]  Fax: [phone]  Follow Up Time:

## 2020-08-26 NOTE — ED PROVIDER NOTE - PMH
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

## 2020-08-26 NOTE — ED PROVIDER NOTE - NSFOLLOWUPINSTRUCTIONS_ED_ALL_ED_FT
Urinary Retention    Urinary retention is the inability to completely empty your bladder. This is a common problem in older men, especially with enlarged prostates. If you are sent home with a blanco catheter and a drainage system make sure to keep the drainage bag emptied and lower than your catheter. Keep the blanco catheter in until you follow up with a urologist.    SEEK IMMEDIATE MEDICAL CARE IF YOU DEVELOP THE FOLLOWING SYMPTOMS: the catheter stops draining urine, the catheter falls out, abdominal pain, nausea/vomiting, or chills/fever.

## 2020-08-26 NOTE — ED PROVIDER NOTE - OBJECTIVE STATEMENT
79 yo M with PMHx of BPH with urinary retention s/p blanco placement at [facility] few days ago, has urology appt tomorrow, CHF/SSS, s/p PPM, not on any AC or ASA, presenting c/o hematuria and burning sensation around urethral meatus x 1d.  Pt reports having mild pink tinged urine this morning, now with daja hematuria with dysuria and bladder fullness sensation.  Denies fever, chills, trauma, penile d/c, abdominal pain, change in bowel function, flank pain, rash, HA, dizziness, SOB, CP, palpitations, diaphoresis, cough, and malaise.

## 2020-08-28 ENCOUNTER — EMERGENCY (EMERGENCY)
Facility: HOSPITAL | Age: 80
LOS: 1 days | Discharge: HOME | End: 2020-08-28
Attending: EMERGENCY MEDICINE | Admitting: EMERGENCY MEDICINE
Payer: MEDICARE

## 2020-08-28 VITALS
RESPIRATION RATE: 20 BRPM | HEART RATE: 76 BPM | OXYGEN SATURATION: 96 % | SYSTOLIC BLOOD PRESSURE: 135 MMHG | DIASTOLIC BLOOD PRESSURE: 85 MMHG

## 2020-08-28 VITALS
TEMPERATURE: 98 F | DIASTOLIC BLOOD PRESSURE: 86 MMHG | WEIGHT: 175.05 LBS | OXYGEN SATURATION: 97 % | RESPIRATION RATE: 18 BRPM | HEART RATE: 69 BPM | SYSTOLIC BLOOD PRESSURE: 140 MMHG
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
## 2020-08-28 DIAGNOSIS — T83.098A OTHER MECHANICAL COMPLICATION OF OTHER URINARY CATHETER, INITIAL ENCOUNTER: ICD-10-CM

## 2020-08-28 DIAGNOSIS — Z95.0 PRESENCE OF CARDIAC PACEMAKER: Chronic | ICD-10-CM

## 2020-08-28 LAB
CULTURE RESULTS: NO GROWTH — SIGNIFICANT CHANGE UP
SPECIMEN SOURCE: SIGNIFICANT CHANGE UP

## 2020-08-28 PROCEDURE — 99283 EMERGENCY DEPT VISIT LOW MDM: CPT

## 2020-08-28 NOTE — ED ADULT TRIAGE NOTE - CHIEF COMPLAINT QUOTE
Chief complaint  Patient is a 65y old  Male who presents with a chief complaint of syncope (18 May 2020 11:35)   Review of systems  Patient sitting up in chair, looks comfortable, no hypoglycemic episodes.    Labs and Fingersticks  CAPILLARY BLOOD GLUCOSE      POCT Blood Glucose.: 191 mg/dL (18 May 2020 12:16)  POCT Blood Glucose.: 176 mg/dL (18 May 2020 09:00)  POCT Blood Glucose.: 277 mg/dL (17 May 2020 21:47)  POCT Blood Glucose.: 249 mg/dL (17 May 2020 17:18)      Anion Gap, Serum: 10 (05-18 @ 06:44)  Anion Gap, Serum: 13 (05-17 @ 06:43)      Calcium, Total Serum: 9.0 (05-18 @ 06:44)  Calcium, Total Serum: 8.8 (05-17 @ 06:43)  Albumin, Serum: 2.4 <L> (05-18 @ 06:44)  Albumin, Serum: 2.3 <L> (05-17 @ 06:43)    Alanine Aminotransferase (ALT/SGPT): 11 (05-18 @ 06:44)  Alanine Aminotransferase (ALT/SGPT): 15 (05-17 @ 06:43)  Alkaline Phosphatase, Serum: 73 (05-18 @ 06:44)  Alkaline Phosphatase, Serum: 78 (05-17 @ 06:43)  Aspartate Aminotransferase (AST/SGOT): 11 (05-18 @ 06:44)  Aspartate Aminotransferase (AST/SGOT): 17 (05-17 @ 06:43)        05-18    134<L>  |  99  |  29<H>  ----------------------------<  168<H>  4.2   |  25  |  1.38<H>    Ca    9.0      18 May 2020 06:44    TPro  6.0  /  Alb  2.4<L>  /  TBili  0.2  /  DBili  x   /  AST  11  /  ALT  11  /  AlkPhos  73  05-18                        8.8    9.10  )-----------( 308      ( 18 May 2020 06:45 )             28.5     Medications  MEDICATIONS  (STANDING):  aMIOdarone    Tablet 200 milliGRAM(s) Oral daily  aspirin enteric coated 81 milliGRAM(s) Oral daily  atorvastatin 40 milliGRAM(s) Oral at bedtime  BACItracin   Ointment 1 Application(s) Topical daily  buDESOnide    Inhalation Suspension 0.5 milliGRAM(s) Inhalation two times a day  dextrose 5%. 1000 milliLiter(s) (50 mL/Hr) IV Continuous <Continuous>  dextrose 50% Injectable 12.5 Gram(s) IV Push once  dextrose 50% Injectable 25 Gram(s) IV Push once  dextrose 50% Injectable 25 Gram(s) IV Push once  enoxaparin Injectable 40 milliGRAM(s) SubCutaneous daily  insulin glargine Injectable (LANTUS) 28 Unit(s) SubCutaneous at bedtime  insulin lispro (HumaLOG) corrective regimen sliding scale   SubCutaneous three times a day before meals  insulin lispro (HumaLOG) corrective regimen sliding scale   SubCutaneous at bedtime  insulin lispro Injectable (HumaLOG) 12 Unit(s) SubCutaneous three times a day with meals  loratadine 10 milliGRAM(s) Oral daily  metoprolol tartrate 12.5 milliGRAM(s) Oral two times a day  pantoprazole    Tablet 40 milliGRAM(s) Oral before breakfast  polyethylene glycol 3350 17 Gram(s) Oral daily  senna 2 Tablet(s) Oral at bedtime  torsemide 10 milliGRAM(s) Oral daily  vancomycin  IVPB 1000 milliGRAM(s) IV Intermittent every 24 hours      Physical Exam  General: Patient comfortable in bed  Vital Signs Last 12 Hrs  T(F): 97.6 (05-18-20 @ 05:15), Max: 97.6 (05-18-20 @ 05:15)  HR: 87 (05-18-20 @ 05:15) (87 - 87)  BP: 152/78 (05-18-20 @ 05:15) (152/78 - 152/78)  BP(mean): --  RR: 16 (05-18-20 @ 05:15) (16 - 16)  SpO2: 96% (05-18-20 @ 05:15) (96% - 96%)  Neck: No palpable thyroid nodules.  CVS: S1S2, No murmurs  Respiratory: No wheezing, no crepitations  GI: Abdomen soft, bowel sounds positive  Musculoskeletal:  edema lower extremities.   Skin: No skin rashes, no ecchymosis    Diagnostics pt states he has a catheter that isnt draining. He went to urologist yesterday and they didn't assess him at all and told him to come back in a week Chief complaint  Patient is a 65y old  Male who presents with a chief complaint of syncope (18 May 2020 11:35)   Review of systems  Patient sitting up in chair, looks comfortable, no hypoglycemic episodes.    Labs and Fingersticks  CAPILLARY BLOOD GLUCOSE    POCT Blood Glucose.: 191 mg/dL (18 May 2020 12:16)  POCT Blood Glucose.: 176 mg/dL (18 May 2020 09:00)  POCT Blood Glucose.: 277 mg/dL (17 May 2020 21:47)  POCT Blood Glucose.: 249 mg/dL (17 May 2020 17:18)      Anion Gap, Serum: 10 (05-18 @ 06:44)  Anion Gap, Serum: 13 (05-17 @ 06:43)      Calcium, Total Serum: 9.0 (05-18 @ 06:44)  Calcium, Total Serum: 8.8 (05-17 @ 06:43)  Albumin, Serum: 2.4 <L> (05-18 @ 06:44)  Albumin, Serum: 2.3 <L> (05-17 @ 06:43)    Alanine Aminotransferase (ALT/SGPT): 11 (05-18 @ 06:44)  Alanine Aminotransferase (ALT/SGPT): 15 (05-17 @ 06:43)  Alkaline Phosphatase, Serum: 73 (05-18 @ 06:44)  Alkaline Phosphatase, Serum: 78 (05-17 @ 06:43)  Aspartate Aminotransferase (AST/SGOT): 11 (05-18 @ 06:44)  Aspartate Aminotransferase (AST/SGOT): 17 (05-17 @ 06:43)        05-18    134<L>  |  99  |  29<H>  ----------------------------<  168<H>  4.2   |  25  |  1.38<H>    Ca    9.0      18 May 2020 06:44    TPro  6.0  /  Alb  2.4<L>  /  TBili  0.2  /  DBili  x   /  AST  11  /  ALT  11  /  AlkPhos  73  05-18                        8.8    9.10  )-----------( 308      ( 18 May 2020 06:45 )             28.5     Medications  MEDICATIONS  (STANDING):  aMIOdarone    Tablet 200 milliGRAM(s) Oral daily  aspirin enteric coated 81 milliGRAM(s) Oral daily  atorvastatin 40 milliGRAM(s) Oral at bedtime  BACItracin   Ointment 1 Application(s) Topical daily  buDESOnide    Inhalation Suspension 0.5 milliGRAM(s) Inhalation two times a day  dextrose 5%. 1000 milliLiter(s) (50 mL/Hr) IV Continuous <Continuous>  dextrose 50% Injectable 12.5 Gram(s) IV Push once  dextrose 50% Injectable 25 Gram(s) IV Push once  dextrose 50% Injectable 25 Gram(s) IV Push once  enoxaparin Injectable 40 milliGRAM(s) SubCutaneous daily  insulin glargine Injectable (LANTUS) 28 Unit(s) SubCutaneous at bedtime  insulin lispro (HumaLOG) corrective regimen sliding scale   SubCutaneous three times a day before meals  insulin lispro (HumaLOG) corrective regimen sliding scale   SubCutaneous at bedtime  insulin lispro Injectable (HumaLOG) 12 Unit(s) SubCutaneous three times a day with meals  loratadine 10 milliGRAM(s) Oral daily  metoprolol tartrate 12.5 milliGRAM(s) Oral two times a day  pantoprazole    Tablet 40 milliGRAM(s) Oral before breakfast  polyethylene glycol 3350 17 Gram(s) Oral daily  senna 2 Tablet(s) Oral at bedtime  torsemide 10 milliGRAM(s) Oral daily  vancomycin  IVPB 1000 milliGRAM(s) IV Intermittent every 24 hours      Physical Exam  General: Patient comfortable in bed  Vital Signs Last 12 Hrs  T(F): 97.6 (05-18-20 @ 05:15), Max: 97.6 (05-18-20 @ 05:15)  HR: 87 (05-18-20 @ 05:15) (87 - 87)  BP: 152/78 (05-18-20 @ 05:15) (152/78 - 152/78)  BP(mean): --  RR: 16 (05-18-20 @ 05:15) (16 - 16)  SpO2: 96% (05-18-20 @ 05:15) (96% - 96%)  Neck: No palpable thyroid nodules.  CVS: S1S2, No murmurs  Respiratory: No wheezing, no crepitations  GI: Abdomen soft, bowel sounds positive  Musculoskeletal:  edema lower extremities.   Skin: No skin rashes, no ecchymosis    Diagnostics pt states he has a catheter that isn't draining. He went to urologist yesterday and they didn't assess him at all and told him to come back in a week

## 2020-08-28 NOTE — ED ADULT NURSE REASSESSMENT NOTE - NS ED NURSE REASSESS COMMENT FT1
Pt received from [facility]. Pt resting comfortably in stretcher, awaiting transport home. Denies all complaints at this time. Whitlock catheter patent and draining.

## 2020-08-28 NOTE — ED PROVIDER NOTE - OBJECTIVE STATEMENT
Patient is an 80 year old male who is a poor historian with PMH of EtOH abuse, BPH, SSS, and CHF with PPM who presents today due to concern for his indwelling urinary catheter. Patient received catheter 1.5 weeks ago at [facility] ED due to urinary retention. Patient was seen at [facility] ED two days ago for similar concerns of difficulty urinating and hematuria. Patient has been pulling at his catheter. He has been emptying his own bag, usually before he goes to bed which he says he does not sleep overnight but rather goes to bed around 8am. He last emptied his urine bag this morning around 8am before going to sleep and upon waking around 1pm noticed that he had not produced much urine and the urine in the bag is blood tinged. Patient has no other concerns.

## 2020-08-28 NOTE — ED ADULT NURSE NOTE - CHIEF COMPLAINT QUOTE
pt states he has a catheter that isn't draining. He went to urologist yesterday and they didn't assess him at all and told him to come back in a week

## 2020-08-28 NOTE — ED PROVIDER NOTE - CLINICAL SUMMARY MEDICAL DECISION MAKING FREE TEXT BOX
Patient is an 80 year old male with an indwelling urinary catheter x1.5 weeks who was seen here two days ago for similar complaints of small amount of urine in bag and hematuria. After evaluation of catheter it appears to be clotted. Will change catheter and patient will f/u with urology appt next week as planned. He has been instructed to stop pulling at the catheter which is likely resulting in trauma that is causing bleeding.

## 2020-08-28 NOTE — ED PROVIDER NOTE - PATIENT PORTAL LINK FT
You can access the FollowMyHealth Patient Portal offered by [facility] by registering at the following website: [email or web address]. By joining [employer]’s FollowMyHealth portal, you will also be able to view your health information using other applications (apps) compatible with our system.

## 2020-08-28 NOTE — ED ADULT NURSE NOTE - OBJECTIVE STATEMENT
BIBA c/o malfunctioning blanco catheter. Pt with 3 way indwelling blanco cather hooked to leg bag, states placed at [facility] " it has been causing me lots of trouble." Pt recently seen here fo same. Noted small amount of nichole urine in leg bag, attempted to flush catheter with sterile NS, and 2 large clots came out with only mild relief in pressure, and still unable to fully drain bladder. Provider aware and 3way catheter removed, noted additional clot at balloon tip. New 16 Fr catheter placed following sterile technique, pt tolerated well, approx 800ml urine drained. State lock placed to RLE and catheter then switched to leg bag as per provider. Pt states full relief in pressure. Educated fully on catheter care, and pt has f/u with urologist already scheduled for next week.

## 2020-08-28 NOTE — ED ADULT NURSE NOTE - NSIMPLEMENTINTERV_GEN_ALL_ED
Implemented All Fall with Harm Risk Interventions:  [place] to call system. Call bell, personal items and telephone within reach. Instruct patient to call for assistance. Room bathroom lighting operational. Non-slip footwear when patient is off stretcher. Physically safe environment: no spills, clutter or unnecessary equipment. Stretcher in lowest position, wheels locked, appropriate side rails in place. Provide visual cue, wrist band, yellow gown, etc. Monitor gait and stability. Monitor for mental status changes and reorient to person, place, and time. Review medications for side effects contributing to fall risk. Reinforce activity limits and safety measures with patient and family. Provide visual clues: red socks.

## 2020-08-29 PROBLEM — N40.0 BENIGN PROSTATIC HYPERPLASIA WITHOUT LOWER URINARY TRACT SYMPTOMS: Chronic | Status: ACTIVE | Noted: 2020-08-26

## 2020-08-29 PROBLEM — I49.5 SICK SINUS SYNDROME: Chronic | Status: ACTIVE | Noted: 2020-08-26

## 2020-08-29 PROBLEM — I50.9 HEART FAILURE, UNSPECIFIED: Chronic | Status: ACTIVE | Noted: 2020-08-26

## 2020-08-29 RX ORDER — CEFUROXIME AXETIL 250 MG
1 TABLET ORAL
Qty: 14 | Refills: 0
Start: 2020-08-29 | End: 2020-09-04

## 2020-08-30 ENCOUNTER — EMERGENCY (EMERGENCY)
Facility: HOSPITAL | Age: 80
LOS: 1 days | Discharge: HOME | End: 2020-08-30
Attending: EMERGENCY MEDICINE | Admitting: EMERGENCY MEDICINE
Payer: MEDICARE

## 2020-08-30 VITALS
DIASTOLIC BLOOD PRESSURE: 78 MMHG | HEART RATE: 64 BPM | SYSTOLIC BLOOD PRESSURE: 174 MMHG | RESPIRATION RATE: 18 BRPM | OXYGEN SATURATION: 98 % | TEMPERATURE: 99 F

## 2020-08-30 VITALS
RESPIRATION RATE: 18 BRPM | SYSTOLIC BLOOD PRESSURE: 119 MMHG | HEART RATE: 73 BPM | WEIGHT: 175.05 LBS | OXYGEN SATURATION: 97 % | TEMPERATURE: 97 F | DIASTOLIC BLOOD PRESSURE: 64 MMHG

## 2020-08-30 DIAGNOSIS — Z95.0 PRESENCE OF CARDIAC PACEMAKER: Chronic | ICD-10-CM

## 2020-08-30 LAB
APPEARANCE UR: CLEAR — SIGNIFICANT CHANGE UP
BACTERIA # UR AUTO: PRESENT /HPF
BILIRUB UR-MCNC: NEGATIVE — SIGNIFICANT CHANGE UP
COLOR SPEC: YELLOW — SIGNIFICANT CHANGE UP
COMMENT - URINE: SIGNIFICANT CHANGE UP
DIFF PNL FLD: ABNORMAL
EPI CELLS # UR: SIGNIFICANT CHANGE UP /HPF (ref 0–5)
GLUCOSE UR QL: NEGATIVE — SIGNIFICANT CHANGE UP
KETONES UR-MCNC: NEGATIVE — SIGNIFICANT CHANGE UP
LEUKOCYTE ESTERASE UR-ACNC: ABNORMAL
NITRITE UR-MCNC: NEGATIVE — SIGNIFICANT CHANGE UP
PH UR: 5.5 — SIGNIFICANT CHANGE UP (ref 5–8)
PROT UR-MCNC: 30 MG/DL
RBC CASTS # UR COMP ASSIST: > 10 /HPF
SP GR SPEC: 1.02 — SIGNIFICANT CHANGE UP (ref 1–1.03)
UROBILINOGEN FLD QL: 0.2 E.U./DL — SIGNIFICANT CHANGE UP
WBC UR QL: > 10 /HPF

## 2020-08-30 PROCEDURE — 99283 EMERGENCY DEPT VISIT LOW MDM: CPT

## 2020-08-30 RX ORDER — SACCHAROMYCES BOULARDII 250 MG
1 POWDER IN PACKET (EA) ORAL
Qty: 28 | Refills: 0
Start: 2020-08-30 | End: 2020-09-12

## 2020-08-30 NOTE — ED ADULT NURSE REASSESSMENT NOTE - NS ED NURSE REASSESS COMMENT FT1
Pt discharge delayed. Pt states he wants to speak to MD about difficulty sleeping. MD Maya made aware and spoke with patient.  Luz emailed at this time to help with pt follow up appt with MD and possible sleep study and/or home resources. Pt is A&Ox3 at this time. OOB with steady gait. Transportation set up for pt at this time. Pt stable and will continue to monitor.

## 2020-08-30 NOTE — ED PROVIDER NOTE - PROGRESS NOTE DETAILS
Upon several reevaluations pt is alert and oriented to person, time and place but the conversation Upon several reevaluations pt is alert and oriented to person, time and place but the conversation is tangential at times. I spoke to ze Watts for his primary care Dr Jenna Forde at [facility]. They are coordinating  for him and are aware of his sleeping issues. PMD office will give a follow up this week.

## 2020-08-30 NOTE — ED PROVIDER NOTE - PRINCIPAL DIAGNOSIS
Medications reviewed and updated.  Denies known Latex allergy or symptoms of Latex sensitivity.    Pt presents today regarding vomiting on and off with diarrhea and headaches x 3 days  Pt has had a decrease in appetite, able to drink fluiids   Diarrhea

## 2020-08-30 NOTE — ED PROVIDER NOTE - GENITOURINARY, MLM
No discharge, lesions. blanco catheter in place, clear yellow urine in leg bag, no signs of hematuria on leg bag

## 2020-08-30 NOTE — ED PROVIDER NOTE - OBJECTIVE STATEMENT
81 yo male pt, presents with some diarrhea episodes since yesterday. States he started an abx in our ED for urinary symptoms. He had a blanco catheter change. UA and UCx done recently. UCx neg. Pt has had no fever, no chills. Since he was not feeling well he had trouble sleeping last night. Since he was feeling tired he wrote down the times he had taken his antibiotic on some notes that he left at home. Pt is very insistent during our interaction that he would like to go home and bring back his notes to try and remember his symptoms more clearly. He denies any fever, chills, hematuria, HA, neck pain, abd pain, nausea, vomiting or any other symptoms besides the non bloody diarrhea.

## 2020-08-30 NOTE — ED PROVIDER NOTE - CLINICAL SUMMARY MEDICAL DECISION MAKING FREE TEXT BOX
repeated UA, sent UCx, will recommend that pt stops current antibiotic as he had a negative UCx from 2 days ago. Will prescribe florastor to help w diarrhea. Pt has no abd tenderness, low suspicion for Cdiff. Pt is tangential with hx and insists on going home and reading the notes he made of his symptoms and will return to ER if any worsening symptoms.

## 2020-08-30 NOTE — ED PROVIDER NOTE - PATIENT PORTAL LINK FT
You can access the FollowMyHealth Patient Portal offered by [facility] by registering at the following website: [email or web address]. By joining [employer]’s FollowMyHealth portal, you will also be able to view your health information using other applications (apps) compatible with our system.

## 2020-08-30 NOTE — ED ADULT TRIAGE NOTE - CHIEF COMPLAINT QUOTE
pt states hes too tired to answer questions. He didn't bring his "sleep notes with him." Called ED earlier stating he thinks the medicine we gave him the other day is giving him diarrhea.

## 2020-09-01 LAB
CULTURE RESULTS: SIGNIFICANT CHANGE UP
SPECIMEN SOURCE: SIGNIFICANT CHANGE UP

## 2020-09-03 ENCOUNTER — EMERGENCY (EMERGENCY)
Facility: HOSPITAL | Age: 80
LOS: 1 days | Discharge: HOME | End: 2020-09-03
Admitting: EMERGENCY MEDICINE
Payer: MEDICARE

## 2020-09-03 VITALS
OXYGEN SATURATION: 99 % | TEMPERATURE: 99 F | HEART RATE: 78 BPM | WEIGHT: 154.98 LBS | SYSTOLIC BLOOD PRESSURE: 170 MMHG | DIASTOLIC BLOOD PRESSURE: 74 MMHG | RESPIRATION RATE: 17 BRPM

## 2020-09-03 DIAGNOSIS — R19.7 DIARRHEA, UNSPECIFIED: ICD-10-CM

## 2020-09-03 DIAGNOSIS — Z95.0 PRESENCE OF CARDIAC PACEMAKER: Chronic | ICD-10-CM

## 2020-09-03 PROCEDURE — 99283 EMERGENCY DEPT VISIT LOW MDM: CPT

## 2020-09-03 NOTE — ED ADULT NURSE NOTE - NSIMPLEMENTINTERV_GEN_ALL_ED
Implemented All Universal Safety Interventions:  [place] to call system. Call bell, personal items and telephone within reach. Instruct patient to call for assistance. Room bathroom lighting operational. Non-slip footwear when patient is off stretcher. Physically safe environment: no spills, clutter or unnecessary equipment. Stretcher in lowest position, wheels locked, appropriate side rails in place.

## 2020-09-03 NOTE — ED PROVIDER NOTE - PATIENT PORTAL LINK FT
You can access the FollowMyHealth Patient Portal offered by [facility] by registering at the following website: [email or web address]. By joining [employer] [place]’s FollowMyHealth portal, you will also be able to view your health information using other applications (apps) compatible with our system.

## 2020-09-03 NOTE — ED PROVIDER NOTE - CLINICAL SUMMARY MEDICAL DECISION MAKING FREE TEXT BOX
pt presents with hematuria from pulling on his blanco and from having blanco replaced today by urology. no signs of obstruction although clots noted. urine cleared with irrigation, no blockage. pt requesting discharge. has close urology follow up. will d/c.

## 2020-09-03 NOTE — ED ADULT TRIAGE NOTE - MEANS OF ARRIVAL
OT NOTE:  Orders received for OT eval and treat.  Pt currently has bedrest orders, will need upgrade in activity orders prior to eval.   ambulatory

## 2020-09-03 NOTE — ED PROVIDER NOTE - GENITOURINARY, MLM
normal external genitalia, no evidence of trauma, blanco in place, some blood noted in the bag with some clots, no suprapubic tenderness or fullness

## 2020-09-03 NOTE — ED ADULT NURSE NOTE - OBJECTIVE STATEMENT
Pt BIBA with concern for blanco catheter patency. Blanco was replaced at dr's office this afternoon and draining blood tinged urine into leg bag. Bladder non distended to palpation.

## 2020-09-03 NOTE — ED PROVIDER NOTE - OBJECTIVE STATEMENT
79yo M with indwelling blanco with frequent recent visits for hematuria in blanco in the setting of trauma presents today c/o hematuria in blanco. pt notes he saw his urologist today at 5pm and had his balnco changed but since then has been noticing some clots and feels like he has to urinate but is not urinating. pt had UA and Cx done by urologist today. denies any fever, chills, dysuria, abd pain, n/v/d, any other concerns. pt is not on any ac/aspirin. admits to frequently adjusting the catheter.

## 2020-09-03 NOTE — ED ADULT TRIAGE NOTE - CHIEF COMPLAINT QUOTE
Pt biba from home for urinary retention. Pt reports he had blanco change in  office today @5pm. Pt reports emptying collection bag which was full with blood tinged urine when he got home from MD. Pt reports bladder fullness with empty collection bag.

## 2020-09-03 NOTE — ED PROVIDER NOTE - PROGRESS NOTE DETAILS
urine cleared easily after manual irrigation, blanco flushes and irrigates easily, pt requesting discharge and has close follow up with urology.

## 2020-09-03 NOTE — ED PROVIDER NOTE - CARE PLAN
Principal Discharge DX:	Gross hematuria  Secondary Diagnosis:	Problem with Whitlock catheter, subsequent encounter

## 2020-09-03 NOTE — ED PROVIDER NOTE - NSFOLLOWUPINSTRUCTIONS_ED_ALL_ED_FT
CALL YOUR UROLOGIST TOMORROW TO LET THEM KNOW THAT YOU WERE SEEN IN THE ED TONIGHT TO HAVE YOUR CATHETER IRRIGATED.     THE BLEEDING IS LIKELY DUE TO THE TRAUMA OF HAVING THE CATHETER CHANGED AND ALSO THE TRAUMA OF HAVING YOUR PULLING ON IT AND ADJUSTING IT.     CONTINUE TO MONITOR OUTFLOW FROM THE KING TO BE SURE IT IS DRAINING.     RETURN TO ER FOR ANY NEW OR CONCERNING SYMPTOMS INCLUDING FEVER, CHILLS, ABDOMINAL PAIN, NAUSEA, VOMITING, BLOODY URINE, DECREASED OUTPUT FROM CATHETER, WEAKNESS, ANY OTHER CONCERNS.

## 2020-09-04 VITALS
HEART RATE: 67 BPM | OXYGEN SATURATION: 96 % | TEMPERATURE: 98 F | SYSTOLIC BLOOD PRESSURE: 169 MMHG | RESPIRATION RATE: 16 BRPM | DIASTOLIC BLOOD PRESSURE: 89 MMHG

## 2020-09-07 DIAGNOSIS — Z88.1 ALLERGY STATUS TO OTHER ANTIBIOTIC AGENTS STATUS: ICD-10-CM
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
## 2020-09-07 DIAGNOSIS — R31.0 GROSS HEMATURIA: ICD-10-CM

## 2020-09-07 DIAGNOSIS — T83.098D OTHER MECHANICAL COMPLICATION OF OTHER URINARY CATHETER, SUBSEQUENT ENCOUNTER: ICD-10-CM

## 2020-09-19 ENCOUNTER — EMERGENCY (EMERGENCY)
Facility: HOSPITAL | Age: 80
LOS: 1 days | Discharge: HOME | End: 2020-09-19
Attending: EMERGENCY MEDICINE | Admitting: EMERGENCY MEDICINE
Payer: MEDICARE

## 2020-09-19 VITALS
OXYGEN SATURATION: 98 % | HEART RATE: 65 BPM | DIASTOLIC BLOOD PRESSURE: 80 MMHG | RESPIRATION RATE: 17 BRPM | SYSTOLIC BLOOD PRESSURE: 149 MMHG | TEMPERATURE: 98 F

## 2020-09-19 VITALS
DIASTOLIC BLOOD PRESSURE: 72 MMHG | HEART RATE: 68 BPM | SYSTOLIC BLOOD PRESSURE: 145 MMHG | RESPIRATION RATE: 18 BRPM | TEMPERATURE: 98 F | OXYGEN SATURATION: 98 %

## 2020-09-19 DIAGNOSIS — T83.098A OTHER MECHANICAL COMPLICATION OF OTHER URINARY CATHETER, INITIAL ENCOUNTER: ICD-10-CM

## 2020-09-19 DIAGNOSIS — Z88.1 ALLERGY STATUS TO OTHER ANTIBIOTIC AGENTS STATUS: ICD-10-CM
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
## 2020-09-19 DIAGNOSIS — Z95.0 PRESENCE OF CARDIAC PACEMAKER: Chronic | ICD-10-CM

## 2020-09-19 PROCEDURE — 99282 EMERGENCY DEPT VISIT SF MDM: CPT

## 2020-09-19 NOTE — ED PROVIDER NOTE - NSFOLLOWUPINSTRUCTIONS_ED_ALL_ED_FT
Urinary Leg Bag    WHAT YOU NEED TO KNOW:    What is a urinary leg bag? A urinary leg bag holds urine that drains from your catheter. It fits under your clothes and allows you to do your normal daily activities.              How do I use a urinary leg bag?     Wash your hands before and after you touch your catheter, tubing, or drainage bag. Use soap and water. This reduces the risk of infection.       Strap your leg bag to your thigh or calf. Make sure the straps are comfortable. The straps can cause problems with blood flow in your leg if they are too tight.       Clean the tip of the drainage tube with alcohol before attaching it to your catheter. This helps prevent bacteria from getting into your catheter.      The connecting tube should not pull on your catheter. Skin breakdown can occur if there is constant pulling on the catheter.       Check the tube often to make sure it is not kinked or twisted. Blockage in the tube can cause urine to back up into your bladder. Your urine must flow straight through the tube into your leg bag.       Always keep the leg bag below your bladder. This prevents urine from the bag going back into your bladder, which may cause an infection.      Empty your leg bag when it is ½ full, or every 3 hours. A full bag may break or disconnect from the catheter.       Change to your bedside bag before you go to bed. Your bedside bag can hold more urine. Do not use your leg bag at night because it could become too full or break.       Clean your leg bag after every use. Fill the bag with 2 parts vinegar and 3 parts water. Let it soak for 20 minutes, then rinse and let dry. Follow your healthcare provider's instruction on replacing your leg bag with a new one.    CARE AGREEMENT:    You have the right to help plan your care. Learn about your health condition and how it may be treated. Discuss treatment options with your healthcare providers to decide what care you want to receive. You always have the right to refuse treatment.    Whitlock Catheter Placement and Care    WHAT YOU NEED TO KNOW:    A Whitlock catheter is a sterile tube that is inserted into your bladder to drain urine. It is also called an indwelling urinary catheter. The tip of the catheter has a small balloon filled with solution that holds the catheter inside your bladder.              DISCHARGE INSTRUCTIONS:    Return to the emergency department if:     Your catheter comes out.       You suddenly have material that looks like sand in the tubing or drainage bag.      No urine is draining into the bag and you have checked the system.      You have pain in your hip, back, pelvis, or lower abdomen.      You are confused or cannot think clearly.    Call your doctor or urologist if:     You have a fever.      You have bladder spasms for more than 1 day after the catheter is placed.      You see blood in the tubing or drainage bag.      You have a rash or itching where the catheter tube is secured to your skin.      Urine leaks from or around the catheter, tubing, or drainage bag.      The closed drainage system has accidently come open or apart.       You see a layer of crystals inside the tubing.      You have questions or concerns about your condition or care.    Care for your Whitlock catheter:     Clean your genital area 2 times every day.Clean your catheter and the area around where it was inserted. Use soap and water. Clean your anal opening and catheter area after every bowel movement.       Secure the catheter tube so you do not pull or move the catheter. This helps prevent pain and bladder spasms. Healthcare providers will show you how to use medical tape or a strap to secure the catheter tube to your body.       Keep a closed drainage system. Your Whitlock catheter should always be attached to the drainage bag to form a closed system. Do not disconnect any part of the closed system unless you need to change the bag.    Care for your drainage bag:     Ask if a leg bag is right for you. A leg bag can be worn under your clothes. Ask your healthcare provider for more information about a leg bag.       Keep the drainage bag below the level of your waist. This helps stop urine from moving back up the tubing and into your bladder. Do not loop or kink the tubing. This can cause urine to back up and collect in your bladder. Do not let the drainage bag touch or lie on the floor.      Empty the drainage bag when needed. The weight of a full drainage bag can be painful. Empty the drainage bag every 3 to 6 hours or when it is ? full.       Clean and change the drainage bag as directed. Ask your healthcare provider how often you should change the drainage bag and what cleaning solution to use. Wear disposable gloves when you change the bag. Do not allow the end of the catheter or tubing to touch anything. Clean the ends with an alcohol pad before you reconnect them.    What to do if problems develop:     No urine is draining into the bag:   Check for kinks in the tubing and straighten them out.       Check the tape or strap used to secure the catheter tube to your skin. Make sure it is not blocking the tube.       Make sure you are not sitting or lying on the tubing.      Make sure the urine bag is hanging below the level of your waist.      Urine leaks from or around the catheter, tubing, or drainage bag: Check if the closed drainage system has accidently come open or apart. Clean the catheter and tubing ends with a new alcohol pad and reconnect them.     Prevent an infection:     Wash your hands often. Wash before and after you touch your catheter, tubing, or drainage bag. Use soap and water. Wear clean disposable gloves when you care for your catheter or disconnect the drainage bag. Wash your hands before you prepare or eat food. Handwashing           Drink liquids as directed. Ask your healthcare provider how much liquid to drink each day and which liquids are best for you. Liquids will help flush your kidneys and bladder to help prevent infection.    Follow up with your doctor or urologist as directed: Write down your questions so you remember to ask them during your visits.

## 2020-09-19 NOTE — ED PROVIDER NOTE - OBJECTIVE STATEMENT
79 yo with blocked chronic indwelling blanco, may have had UTI and was rxed PO abx by his PMD which he started today.

## 2020-09-19 NOTE — ED PROVIDER NOTE - PHYSICAL EXAMINATION
VSS in NAD non toxic appearing   NCAT EOMI PERRL OP clear  abd soft NT ND no CVAT no guarding no rebound   normal neuro exam CN I-XII grossly intact, no groos motor or sensory deficits  no peripheral c/c/e

## 2020-09-19 NOTE — ED PROVIDER NOTE - PATIENT PORTAL LINK FT
You can access the FollowMyHealth Patient Portal offered by [facility] by registering at the following website: [email or web address]. By joining [employer]’s FollowMyHealth portal, you will also be able to view your health information using other applications (apps) compatible with our system.

## 2020-09-19 NOTE — ED ADULT NURSE NOTE - CHPI ED NUR SYMPTOMS NEG
no chills/no weakness/no decreased eating/drinking/no fever/no nausea/no tingling/no pain/no dizziness/no vomiting

## 2020-09-19 NOTE — ED ADULT TRIAGE NOTE - CHIEF COMPLAINT QUOTE
pt. presents to the ED with indwelling blanco catheter c/o no urine in his blanco bag and the urge to pee.

## 2020-09-19 NOTE — ED ADULT NURSE NOTE - OBJECTIVE STATEMENT
. presents to the ED with indwelling blanco catheter c/o no urine in his blanco bag and the urge to pee.

## 2020-10-02 ENCOUNTER — EMERGENCY (EMERGENCY)
Facility: HOSPITAL | Age: 80
LOS: 1 days | Discharge: HOME | End: 2020-10-02
Admitting: EMERGENCY MEDICINE
Payer: MEDICARE

## 2020-10-02 VITALS
TEMPERATURE: 99 F | HEART RATE: 74 BPM | OXYGEN SATURATION: 98 % | SYSTOLIC BLOOD PRESSURE: 156 MMHG | DIASTOLIC BLOOD PRESSURE: 82 MMHG | WEIGHT: 160.06 LBS | RESPIRATION RATE: 17 BRPM
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
## 2020-10-02 DIAGNOSIS — T83.098D OTHER MECHANICAL COMPLICATION OF OTHER URINARY CATHETER, SUBSEQUENT ENCOUNTER: ICD-10-CM

## 2020-10-02 DIAGNOSIS — Z95.0 PRESENCE OF CARDIAC PACEMAKER: Chronic | ICD-10-CM

## 2020-10-02 PROCEDURE — 99283 EMERGENCY DEPT VISIT LOW MDM: CPT

## 2020-10-02 NOTE — ED PROVIDER NOTE - NSFOLLOWUPINSTRUCTIONS_ED_ALL_ED_FT
Whitlock Catheter Placement and Care    WHAT YOU NEED TO KNOW:    A Whitlock catheter is a sterile tube that is inserted into your bladder to drain urine. It is also called an indwelling urinary catheter.     DISCHARGE INSTRUCTIONS:    Seek care immediately if:   •Your catheter comes out.       •You suddenly have material that looks like sand in the tubing or drainage bag.      •No urine is draining into the bag and you have checked the system.      •You have pain in your hip, back, pelvis, or lower abdomen.      •You are confused or cannot think clearly.      Call your doctor or urologist if:   •You have a fever.      •You have bladder spasms for more than 1 day after the catheter is placed.      •You see blood in the tubing or drainage bag.      •You have a rash or itching where the catheter tube is secured to your skin.      •Urine leaks from or around the catheter, tubing, or drainage bag.      •The closed drainage system has accidently come open or apart.       •You see a layer of crystals inside the tubing.      •You have questions or concerns about your condition or care.      Care for your catheter and drainage bag: You can reduce your risk for infection and injury by caring for your catheter and drainage bag properly.  •Wash your hands often. Wash before and after you touch your catheter, tubing, or drainage bag. Use soap and water. Wear clean disposable gloves when you care for your catheter or disconnect the drainage bag. Wash your hands before you prepare or eat food.   Handwashing           •Clean your genital area 2 times every day. (***What do you think about this arrangement?***) Clean your catheter area and anal opening after every bowel movement. ?For men: Use a soapy cloth to clean the tip of your penis. Start where the catheter enters. Wipe backward making sure to pull back the foreskin. Then use a cloth with clear water in the same direction to clean away the soap.       ?For women: Use a soapy cloth to clean the area that the catheter enters your body. Make sure to separate your labia and wipe toward the anus. Then use a cloth with clear water and wipe in the same direction.       •Secure the catheter tube so you do not pull or move the catheter. This helps prevent pain and bladder spasms. Healthcare providers will show you how to use medical tape or a strap to secure the catheter tube to your body.       •Keep a closed drainage system. Your catheter should always be attached to the drainage bag to form a closed system. Do not disconnect any part of the closed system unless you need to change the bag.      •Keep the drainage bag below the level of your waist. This helps stop urine from moving back up the tubing and into your bladder. Do not loop or kink the tubing. This can cause urine to back up and collect in your bladder. Do not let the drainage bag touch or lie on the floor.      •Empty the drainage bag when needed. The weight of a full drainage bag can be painful. Empty the drainage bag every 3 to 6 hours or when it is ? full.       •Clean and change the drainage bag as directed. Ask your healthcare provider how often you should change the drainage bag and what cleaning solution to use. Wear disposable gloves when you change the bag. Do not allow the end of the catheter or tubing to touch anything. Clean the ends with an alcohol pad before you reconnect them.      What to do if problems develop:   •No urine is draining into the bag: ?Check for kinks in the tubing and straighten them out.       ?Check the tape or strap used to secure the catheter tube to your skin. Make sure it is not blocking the tube.       ?Make sure you are not sitting or lying on the tubing.      ?Make sure the urine bag is hanging below the level of your waist.      •Urine leaks from or around the catheter, tubing, or drainage bag: Check if the closed drainage system has accidently come open or apart. Clean the catheter and tubing ends with a new alcohol pad and reconnect them.       Follow up with your doctor or urologist as directed: Write down your questions so you remember to ask them during your visits

## 2020-10-02 NOTE — ED ADULT NURSE NOTE - PRIMARY CARE PROVIDER
Continue asa/plavix  No bb due to bradycardia  oob to chair  encourage ambulation, ambulate with assist  Cough and deep breathe, Incentive Spirometry Q1h, Chest PT.  Disposition: Rehab when bed available unk

## 2020-10-02 NOTE — ED PROVIDER NOTE - CARE PROVIDERS DIRECT ADDRESSES
,[email or web address],DirectAddress_Unknown

## 2020-10-02 NOTE — ED PROVIDER NOTE - OBJECTIVE STATEMENT
81 yo M with PMHx of BPH with urinary retention s/p blanco placement at [facility] few wks ago, has followed up with his private urologist but doesn't recall the exact date, CHF/SSS, s/p PPM, not on any AC or ASA, presenting c/o leakage of urine at the tip of his penis today.  Pt reports having some wet sensation in his underwear today and unsure if his catheter is functioning properly and here for further evaluation.  Denies fever, chills, trauma, purulent penile d/c, abdominal pain, change in bowel function, flank pain, rash, HA, dizziness, SOB, CP, palpitations, diaphoresis, cough, and malaise. 79 yo M with PMHx of BPH with urinary retention s/p blanco exchange at [facility] last wk ago, has followed up with his private urologist but doesn't recall the exact date, CHF/SSS, s/p PPM, not on any AC or ASA, presenting c/o leakage of urine at the tip of his penis today.  Pt reports having some wet sensation in his underwear today and unsure if his catheter is functioning properly and here for further evaluation.  Denies fever, chills, trauma, purulent penile d/c, abdominal pain, change in bowel function, flank pain, rash, HA, dizziness, SOB, CP, palpitations, diaphoresis, cough, and malaise.

## 2020-10-02 NOTE — ED ADULT NURSE NOTE - CHPI ED NUR SYMPTOMS NEG
no pain/no weakness/no nausea/no chills/no fever/no decreased eating/drinking/no tingling/no vomiting/no dizziness

## 2020-10-02 NOTE — ED ADULT NURSE NOTE - OBJECTIVE STATEMENT
"my urinary cath is leaking" "my urinary cath is leaking"  no obvious s/s of leaking  cath to leg bag draining clear urine   free flowing urine appreciated

## 2020-10-02 NOTE — ED PROVIDER NOTE - PROVIDER TOKENS
PROVIDER:[TOKEN:[[id number]]] PROVIDER:[TOKEN:[[id number]]],FREE:[LAST:[your urologist],PHONE:[(   )    -],FAX:[(   )    -]]

## 2020-10-02 NOTE — ED PROVIDER NOTE - CLINICAL SUMMARY MEDICAL DECISION MAKING FREE TEXT BOX
pt with BPH and urinary retention s/p blanco exchange at [facility] last wk, well known to our facility as well, reports subjective feeling of leakage around the penile region, no associated trauma or bleeding noted, +voiding fine into the leg bag with normal UOP, blanco checked at bedside with normal flow and no leakage noted, pt reassured and instructed prompt f/u with his urologist. strict return precautions discussed

## 2020-10-02 NOTE — ED PROVIDER NOTE - CARE PROVIDER_API CALL
Anders Stroud)  Urology  [address], [facility], [place] [address]  Phone: [phone]  Fax: [phone]  Follow Up Time:    Anders Stroud)  Urology  [address], [facility]  [place], [place] [address]  Phone: [phone]  Fax: [phone]  Follow Up Time:     your urologist,   Phone: (   )    -  Fax: (   )    -  Follow Up Time:

## 2020-10-02 NOTE — ED ADULT TRIAGE NOTE - CHIEF COMPLAINT QUOTE
Urethral catheter leaking at tip of penis this evening as per pt. Pt does not recall last time he saw urologist for catheter replacement. Denies bladder fullness.

## 2020-10-02 NOTE — ED ADULT NURSE NOTE - NSIMPLEMENTINTERV_GEN_ALL_ED
Implemented All Universal Safety Interventions:  [place] to call system. Call bell, personal items and telephone within reach. Instruct patient to call for assistance. Room bathroom lighting operational. Non-slip footwear when patient is off stretcher. Physically safe environment: no spills, clutter or unnecessary equipment. Stretcher in lowest position, wheels locked, appropriate side rails in place.

## 2020-10-02 NOTE — ED PROVIDER NOTE - PHYSICAL EXAMINATION
Gen - WDWN elderly M, NAD, comfortable and non-toxic appearing  Skin - warm, dry, intact   HEENT - AT/NC, airway patent, neck supple   CV - S1S2, R/R/R  Resp - CTAB, no r/r/w  GI - soft, ND, NT, no CVAT b/l    - 16 fr blanco in place with no active bleeding or dc, leg bag in place with yellow urine noted  MS - w/w/p, no c/c/e, no scrotal or penile tenderness or ulceration noted   Neuro - AxOx3, ambulatory without gait disturbance

## 2020-10-02 NOTE — ED PROVIDER NOTE - PATIENT PORTAL LINK FT
You can access the FollowMyHealth Patient Portal offered by [facility] by registering at the following website: [email or web address]. By joining [employer]’s FollowMyHealth portal, you will also be able to view your health information using other applications (apps) compatible with our system.

## 2020-12-09 ENCOUNTER — APPOINTMENT (OUTPATIENT)
Dept: UROLOGY | Facility: CLINIC | Age: 80
End: 2020-12-09

## 2020-12-21 PROBLEM — Z12.11 ENCOUNTER FOR SCREENING COLONOSCOPY: Status: RESOLVED | Noted: 2019-10-28 | Resolved: 2020-12-21

## 2020-12-26 NOTE — ED ADULT NURSE NOTE - NS ED NURSE LEVEL OF CONSCIOUSNESS MENTAL STATUS
12/25/20 2055   Patient Assessment/Suction   Level of Consciousness (AVPU) alert   Respiratory Effort Normal;Unlabored   Expansion/Accessory Muscles/Retractions no use of accessory muscles;no retractions;expansion symmetric   All Lung Fields Breath Sounds equal bilaterally;diminished   Rhythm/Pattern, Respiratory unlabored;pattern regular;depth regular   Cough Frequency no cough   PRE-TX-O2   O2 Device (Oxygen Therapy) nasal cannula   $ Is the patient on Low Flow Oxygen? Yes   Flow (L/min) 3   SpO2 95 %   Pulse 89   Resp 20   Inhaler   $ Inhaler Charges MDI (Metered Dose Inahler) Treatment   Daily Review of Necessity (Inhaler) completed   Respiratory Treatment Status (Inhaler) given   Treatment Route (Inhaler) mouthpiece;spacer/holding chamber   Patient Position (Inhaler) sitting in chair   Post Treatment Assessment (Inhaler) breath sounds unchanged;vital signs unchanged   Signs of Intolerance (Inhaler) none   Incentive Spirometer   $ Incentive Spirometer Charges done independently per patient   Wound Care   $ Wound Care Tech Time 15 min   Area of Concern Left;Right;Behind ear;Cheek   Skin Color/Characteristics without discoloration   Skin Temperature warm   Education   $ Education Bronchodilator;15 min   Respiratory Evaluation   $ Care Plan Tech Time 15 min      Awake/Cooperative/Alert

## 2021-01-13 ENCOUNTER — EMERGENCY (EMERGENCY)
Facility: HOSPITAL | Age: 81
LOS: 1 days | Discharge: HOME | End: 2021-01-13
Attending: EMERGENCY MEDICINE | Admitting: EMERGENCY MEDICINE
Payer: MEDICARE

## 2021-01-13 VITALS
HEIGHT: 73 IN | WEIGHT: 139.99 LBS | DIASTOLIC BLOOD PRESSURE: 89 MMHG | OXYGEN SATURATION: 98 % | RESPIRATION RATE: 18 BRPM | SYSTOLIC BLOOD PRESSURE: 148 MMHG | HEART RATE: 89 BPM | TEMPERATURE: 98 F
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
## 2021-01-13 DIAGNOSIS — T83.091A OTHER MECHANICAL COMPLICATION OF INDWELLING URETHRAL CATHETER, INITIAL ENCOUNTER: ICD-10-CM

## 2021-01-13 DIAGNOSIS — Z95.0 PRESENCE OF CARDIAC PACEMAKER: Chronic | ICD-10-CM

## 2021-01-13 PROCEDURE — 99283 EMERGENCY DEPT VISIT LOW MDM: CPT

## 2021-01-13 NOTE — ED PROVIDER NOTE - NSFOLLOWUPINSTRUCTIONS_ED_ALL_ED_FT
Whitlock Catheter Placement and Care    WHAT YOU NEED TO KNOW:    A Whitlock catheter is a sterile tube that is inserted into your bladder to drain urine. It is also called an indwelling urinary catheter.     DISCHARGE INSTRUCTIONS:    Return to the emergency department if:   •Your catheter comes out.       •You suddenly have material that looks like sand in the tubing or drainage bag.      •No urine is draining into the bag and you have checked the system.      •You have pain in your hip, back, pelvis, or lower abdomen.      •You are confused or cannot think clearly.      Call your doctor or urologist if:   •You have a fever.      •You have bladder spasms for more than 1 day after the catheter is placed.      •You see blood in the tubing or drainage bag.      •You have a rash or itching where the catheter tube is secured to your skin.      •Urine leaks from or around the catheter, tubing, or drainage bag.      •The closed drainage system has accidently come open or apart.       •You see a layer of crystals inside the tubing.      •You have questions or concerns about your condition or care.      Care for your catheter and drainage bag: You can reduce your risk for infection and injury by caring for your catheter and drainage bag properly.  •Wash your hands often. Wash before and after you touch your catheter, tubing, or drainage bag. Use soap and water. Wear clean disposable gloves when you care for your catheter or disconnect the drainage bag. Wash your hands before you prepare or eat food.   Handwashing           •Clean your genital area 2 times every day. Clean your catheter area and anal opening after every bowel movement. ?For men: Use a soapy cloth to clean the tip of your penis. Start where the catheter enters. Wipe backward making sure to pull back the foreskin. Then use a cloth with clear water in the same direction to clean away the soap.       ?For women: Use a soapy cloth to clean the area that the catheter enters your body. Make sure to separate your labia and wipe toward the anus. Then use a cloth with clear water and wipe in the same direction.       •Secure the catheter tube so you do not pull or move the catheter. This helps prevent pain and bladder spasms. Healthcare providers will show you how to use medical tape or a strap to secure the catheter tube to your body.       •Keep a closed drainage system. Your catheter should always be attached to the drainage bag to form a closed system. Do not disconnect any part of the closed system unless you need to change the bag.      •Keep the drainage bag below the level of your waist. This helps stop urine from moving back up the tubing and into your bladder. Do not loop or kink the tubing. This can cause urine to back up and collect in your bladder. Do not let the drainage bag touch or lie on the floor.      •Empty the drainage bag when needed. The weight of a full drainage bag can be painful. Empty the drainage bag every 3 to 6 hours or when it is ? full.       •Clean and change the drainage bag as directed. Ask your healthcare provider how often you should change the drainage bag and what cleaning solution to use. Wear disposable gloves when you change the bag. Do not allow the end of the catheter or tubing to touch anything. Clean the ends with an alcohol pad before you reconnect them.      What to do if problems develop:   •No urine is draining into the bag: ?Check for kinks in the tubing and straighten them out.       ?Check the tape or strap used to secure the catheter tube to your skin. Make sure it is not blocking the tube.       ?Make sure you are not sitting or lying on the tubing.      ?Make sure the urine bag is hanging below the level of your waist.      •Urine leaks from or around the catheter, tubing, or drainage bag: Check if the closed drainage system has accidently come open or apart. Clean the catheter and tubing ends with a new alcohol pad and reconnect them.       Follow up with your doctor or urologist as directed: Write down your questions so you remember to ask them during your visits.

## 2021-01-13 NOTE — ED PROVIDER NOTE - CLINICAL SUMMARY MEDICAL DECISION MAKING FREE TEXT BOX
Will exchange blanco. Urine appears clean. Pt has no discomfort or acute findings on exam. Mostly pt is nervous about having the blanco in for a prolonged amount of time without exchanging it. d/c home to f/u with urologist outpt. Follows at [facility].

## 2021-01-13 NOTE — ED ADULT TRIAGE NOTE - CHIEF COMPLAINT QUOTE
c/o urinary catheter discomfort. reports tenderness and burning at the meatus. Catheter placed on sept 22 2020 for urinary retention.

## 2021-01-13 NOTE — ED PROVIDER NOTE - OBJECTIVE STATEMENT
81 y/o M w/hx BPH/urinary retention w/chronic indwelling blanco, last exchanged approx 1mo ago, p/w some discomfort/burning at the meatus, requesting blanco change as he is unable to see his urologist for another 2 weeks. Draining to leg bag clear light yellow urine. No abd/suprapubic discomfort. Denies leaking.

## 2021-01-13 NOTE — ED PROVIDER NOTE - PHYSICAL EXAMINATION
CONSTITUTIONAL: Well appearing, well nourished, awake, alert, oriented to person, place, time/situation and in no apparent distress.  ENT: Airway patent, Nasal mucosa clear. Mouth with normal mucosa.  EYES: Clear bilaterally.  RESPIRATORY: Breathing comfortably with normal RR.  CARDIO: RRR  ABD: soft NTND  : blanco in place draining to leg bag, no erythema/skin breakdown or discharge/leakage at meatus. circumcised, no lesions  MSK: Range of motion is not limited, no deformities noted.  NEURO: Alert and oriented, no focal deficits.  SKIN: Skin normal color for race, warm, dry and intact. No evidence of rash.  PSYCH: Alert and oriented to person, place, time/situation. normal mood and affect. no apparent risk to self or others.

## 2021-01-13 NOTE — ED ADULT NURSE NOTE - NS ED NURSE LEVEL OF CONSCIOUSNESS ORIENTATION
Called home phone and line was busy.  Called mobile and left message to return call.   Oriented - self; Oriented - place; Oriented - time

## 2021-01-13 NOTE — ED ADULT NURSE NOTE - OBJECTIVE STATEMENT
pt. presents to the ED with indwelling blanco cath c/o burning at the meatus. Noted slight redness around the insertion site of blanco. Old blanco removed and new blanco in place pt. tolerated well. Barrier cream applied around meatus to protect from further irritation.

## 2021-01-13 NOTE — ED PROVIDER NOTE - PATIENT PORTAL LINK FT
You can access the FollowMyHealth Patient Portal offered by [facility] by registering at the following website: [email or web address]. By joining [employer]’s FollowMyHealth portal, you will also be able to view your health information using other applications (apps) compatible with our system.

## 2021-01-24 ENCOUNTER — EMERGENCY (EMERGENCY)
Facility: HOSPITAL | Age: 81
LOS: 1 days | Discharge: HOME | End: 2021-01-24
Attending: EMERGENCY MEDICINE | Admitting: EMERGENCY MEDICINE
Payer: MEDICARE

## 2021-01-24 VITALS
SYSTOLIC BLOOD PRESSURE: 133 MMHG | RESPIRATION RATE: 18 BRPM | OXYGEN SATURATION: 98 % | TEMPERATURE: 98 F | DIASTOLIC BLOOD PRESSURE: 76 MMHG | HEART RATE: 77 BPM

## 2021-01-24 VITALS
SYSTOLIC BLOOD PRESSURE: 135 MMHG | OXYGEN SATURATION: 96 % | HEART RATE: 80 BPM | WEIGHT: 139.99 LBS | DIASTOLIC BLOOD PRESSURE: 80 MMHG | TEMPERATURE: 98 F | RESPIRATION RATE: 18 BRPM | HEIGHT: 73 IN

## 2021-01-24 DIAGNOSIS — Z88.8 ALLERGY STATUS TO OTHER DRUGS, MEDICAMENTS AND BIOLOGICAL SUBSTANCES: ICD-10-CM

## 2021-01-24 DIAGNOSIS — Z95.0 PRESENCE OF CARDIAC PACEMAKER: Chronic | ICD-10-CM

## 2021-01-24 DIAGNOSIS — Z46.6 ENCOUNTER FOR FITTING AND ADJUSTMENT OF URINARY DEVICE: ICD-10-CM

## 2021-01-24 PROCEDURE — 99282 EMERGENCY DEPT VISIT SF MDM: CPT

## 2021-01-24 NOTE — ED PROVIDER NOTE - PATIENT PORTAL LINK FT
You can access the FollowMyHealth Patient Portal offered by [facility] by registering at the following website: [email or web address]. By joining [employer]’s FollowMyHealth portal, you will also be able to view your health information using other applications (apps) compatible with our system.

## 2021-01-24 NOTE — ED PROVIDER NOTE - OBJECTIVE STATEMENT
79 yo male pt, presents for check of blanco catheter. Feels the way he is sleeping and the slanting on his floor at home are affecting the drainage of his blanco catheter. When he got up from bed then the drainage was normal. no fever, no chills, no abd pain, no hematuria, no pain in catheter site.

## 2021-01-24 NOTE — ED ADULT NURSE NOTE - CAS ELECT INFOMATION PROVIDED
Symptomatic improvement after nebulizer treatment  Prednisone and antibiotics as directed  Continue Advair and Ventolin rescue inhaler as needed  Follow-up with PCP in 3-5 days
DC instructions

## 2021-01-25 ENCOUNTER — EMERGENCY (EMERGENCY)
Facility: HOSPITAL | Age: 81
LOS: 1 days | Discharge: HOME | End: 2021-01-25
Attending: EMERGENCY MEDICINE | Admitting: EMERGENCY MEDICINE
Payer: MEDICARE

## 2021-01-25 VITALS
DIASTOLIC BLOOD PRESSURE: 78 MMHG | HEIGHT: 73 IN | WEIGHT: 156.53 LBS | OXYGEN SATURATION: 97 % | HEART RATE: 79 BPM | TEMPERATURE: 98 F | RESPIRATION RATE: 15 BRPM | SYSTOLIC BLOOD PRESSURE: 179 MMHG

## 2021-01-25 DIAGNOSIS — R33.9 RETENTION OF URINE, UNSPECIFIED: ICD-10-CM

## 2021-01-25 DIAGNOSIS — Z95.0 PRESENCE OF CARDIAC PACEMAKER: Chronic | ICD-10-CM

## 2021-01-25 DIAGNOSIS — N50.89 OTHER SPECIFIED DISORDERS OF THE MALE GENITAL ORGANS: ICD-10-CM

## 2021-01-25 LAB
ALBUMIN SERPL ELPH-MCNC: 3.6 G/DL — SIGNIFICANT CHANGE UP (ref 3.4–5)
ALP SERPL-CCNC: 102 U/L — SIGNIFICANT CHANGE UP (ref 40–120)
ALT FLD-CCNC: 12 U/L — SIGNIFICANT CHANGE UP (ref 12–42)
ANION GAP SERPL CALC-SCNC: 10 MMOL/L — SIGNIFICANT CHANGE UP (ref 9–16)
APPEARANCE UR: ABNORMAL
AST SERPL-CCNC: 27 U/L — SIGNIFICANT CHANGE UP (ref 15–37)
BACTERIA # UR AUTO: ABNORMAL /HPF
BASOPHILS # BLD AUTO: 0.02 K/UL — SIGNIFICANT CHANGE UP (ref 0–0.2)
BASOPHILS NFR BLD AUTO: 0.3 % — SIGNIFICANT CHANGE UP (ref 0–2)
BILIRUB SERPL-MCNC: 0.5 MG/DL — SIGNIFICANT CHANGE UP (ref 0.2–1.2)
BILIRUB UR-MCNC: NEGATIVE — SIGNIFICANT CHANGE UP
BUN SERPL-MCNC: 44 MG/DL — HIGH (ref 7–23)
CALCIUM SERPL-MCNC: 9.8 MG/DL — SIGNIFICANT CHANGE UP (ref 8.5–10.5)
CHLORIDE SERPL-SCNC: 104 MMOL/L — SIGNIFICANT CHANGE UP (ref 96–108)
CO2 SERPL-SCNC: 27 MMOL/L — SIGNIFICANT CHANGE UP (ref 22–31)
COLOR SPEC: YELLOW — SIGNIFICANT CHANGE UP
CREAT SERPL-MCNC: 0.89 MG/DL — SIGNIFICANT CHANGE UP (ref 0.5–1.3)
DIFF PNL FLD: ABNORMAL
EOSINOPHIL # BLD AUTO: 0.02 K/UL — SIGNIFICANT CHANGE UP (ref 0–0.5)
EOSINOPHIL NFR BLD AUTO: 0.3 % — SIGNIFICANT CHANGE UP (ref 0–6)
EPI CELLS # UR: SIGNIFICANT CHANGE UP /HPF (ref 0–5)
GLUCOSE SERPL-MCNC: 142 MG/DL — HIGH (ref 70–99)
GLUCOSE UR QL: NEGATIVE — SIGNIFICANT CHANGE UP
HCT VFR BLD CALC: 34.7 % — LOW (ref 39–50)
HGB BLD-MCNC: 11.5 G/DL — LOW (ref 13–17)
IMM GRANULOCYTES NFR BLD AUTO: 0.3 % — SIGNIFICANT CHANGE UP (ref 0–1.5)
KETONES UR-MCNC: NEGATIVE — SIGNIFICANT CHANGE UP
LEUKOCYTE ESTERASE UR-ACNC: ABNORMAL
LYMPHOCYTES # BLD AUTO: 0.48 K/UL — LOW (ref 1–3.3)
LYMPHOCYTES # BLD AUTO: 7.6 % — LOW (ref 13–44)
MCHC RBC-ENTMCNC: 29.9 PG — SIGNIFICANT CHANGE UP (ref 27–34)
MCHC RBC-ENTMCNC: 33.1 GM/DL — SIGNIFICANT CHANGE UP (ref 32–36)
MCV RBC AUTO: 90.4 FL — SIGNIFICANT CHANGE UP (ref 80–100)
MONOCYTES # BLD AUTO: 0.63 K/UL — SIGNIFICANT CHANGE UP (ref 0–0.9)
MONOCYTES NFR BLD AUTO: 10 % — SIGNIFICANT CHANGE UP (ref 2–14)
NEUTROPHILS # BLD AUTO: 5.11 K/UL — SIGNIFICANT CHANGE UP (ref 1.8–7.4)
NEUTROPHILS NFR BLD AUTO: 81.5 % — HIGH (ref 43–77)
NITRITE UR-MCNC: NEGATIVE — SIGNIFICANT CHANGE UP
NRBC # BLD: 0 /100 WBCS — SIGNIFICANT CHANGE UP (ref 0–0)
PH UR: 7.5 — SIGNIFICANT CHANGE UP (ref 5–8)
PLATELET # BLD AUTO: 219 K/UL — SIGNIFICANT CHANGE UP (ref 150–400)
POTASSIUM SERPL-MCNC: 4.3 MMOL/L — SIGNIFICANT CHANGE UP (ref 3.5–5.3)
POTASSIUM SERPL-SCNC: 4.3 MMOL/L — SIGNIFICANT CHANGE UP (ref 3.5–5.3)
PROT SERPL-MCNC: 7.8 G/DL — SIGNIFICANT CHANGE UP (ref 6.4–8.2)
PROT UR-MCNC: 100 MG/DL
RBC # BLD: 3.84 M/UL — LOW (ref 4.2–5.8)
RBC # FLD: 15.5 % — HIGH (ref 10.3–14.5)
RBC CASTS # UR COMP ASSIST: ABNORMAL /HPF
SODIUM SERPL-SCNC: 141 MMOL/L — SIGNIFICANT CHANGE UP (ref 132–145)
SP GR SPEC: 1.01 — SIGNIFICANT CHANGE UP (ref 1–1.03)
TRI-PHOS CRY UR QL COMP ASSIST: ABNORMAL /HPF
UROBILINOGEN FLD QL: 0.2 E.U./DL — SIGNIFICANT CHANGE UP
WBC # BLD: 6.28 K/UL — SIGNIFICANT CHANGE UP (ref 3.8–10.5)
WBC # FLD AUTO: 6.28 K/UL — SIGNIFICANT CHANGE UP (ref 3.8–10.5)
WBC UR QL: ABNORMAL /HPF

## 2021-01-25 PROCEDURE — 99284 EMERGENCY DEPT VISIT MOD MDM: CPT

## 2021-01-25 NOTE — ED PROVIDER NOTE - PATIENT PORTAL LINK FT
You can access the FollowMyHealth Patient Portal offered by [facility] by registering at the following website: [email or web address]. By joining [employer]’s FollowMyHealth portal, you will also be able to view your health information using other applications (apps) compatible with our system.

## 2021-01-25 NOTE — ED ADULT TRIAGE NOTE - CHIEF COMPLAINT QUOTE
here for urinary retention; blanco cathetar in place PTA; bag is dry as per EMS; "feels pressure in abd and 'has to have a BM to relieve it"

## 2021-01-25 NOTE — ED PROVIDER NOTE - OBJECTIVE STATEMENT
81 y/o male with blanco catheter feels like he cannot urinate. Patient reports intermittent, severe amounts of pain. Patient was here yesterday for similar complaint, and blanco catheter was draining well. No intervention was necessary at the time. Denies fever, chills, abdominal pain, hematuria, or dysuria.

## 2021-01-26 VITALS
TEMPERATURE: 98 F | SYSTOLIC BLOOD PRESSURE: 148 MMHG | RESPIRATION RATE: 16 BRPM | HEART RATE: 55 BPM | DIASTOLIC BLOOD PRESSURE: 82 MMHG | OXYGEN SATURATION: 97 %

## 2021-01-28 LAB
-  AMIKACIN: SIGNIFICANT CHANGE UP
-  AMIKACIN: SIGNIFICANT CHANGE UP
-  AMOXICILLIN/CLAVULANIC ACID: SIGNIFICANT CHANGE UP
-  AMOXICILLIN/CLAVULANIC ACID: SIGNIFICANT CHANGE UP
-  AMPICILLIN/SULBACTAM: SIGNIFICANT CHANGE UP
-  AMPICILLIN/SULBACTAM: SIGNIFICANT CHANGE UP
-  AMPICILLIN: SIGNIFICANT CHANGE UP
-  AMPICILLIN: SIGNIFICANT CHANGE UP
-  AZTREONAM: SIGNIFICANT CHANGE UP
-  AZTREONAM: SIGNIFICANT CHANGE UP
-  CEFAZOLIN: SIGNIFICANT CHANGE UP
-  CEFAZOLIN: SIGNIFICANT CHANGE UP
-  CEFEPIME: SIGNIFICANT CHANGE UP
-  CEFEPIME: SIGNIFICANT CHANGE UP
-  CEFOXITIN: SIGNIFICANT CHANGE UP
-  CEFOXITIN: SIGNIFICANT CHANGE UP
-  CEFTRIAXONE: SIGNIFICANT CHANGE UP
-  CEFTRIAXONE: SIGNIFICANT CHANGE UP
-  CIPROFLOXACIN: SIGNIFICANT CHANGE UP
-  CIPROFLOXACIN: SIGNIFICANT CHANGE UP
-  ERTAPENEM: SIGNIFICANT CHANGE UP
-  ERTAPENEM: SIGNIFICANT CHANGE UP
-  GENTAMICIN: SIGNIFICANT CHANGE UP
-  GENTAMICIN: SIGNIFICANT CHANGE UP
-  IMIPENEM: SIGNIFICANT CHANGE UP
-  IMIPENEM: SIGNIFICANT CHANGE UP
-  LEVOFLOXACIN: SIGNIFICANT CHANGE UP
-  LEVOFLOXACIN: SIGNIFICANT CHANGE UP
-  MEROPENEM: SIGNIFICANT CHANGE UP
-  MEROPENEM: SIGNIFICANT CHANGE UP
-  NITROFURANTOIN: SIGNIFICANT CHANGE UP
-  NITROFURANTOIN: SIGNIFICANT CHANGE UP
-  PIPERACILLIN/TAZOBACTAM: SIGNIFICANT CHANGE UP
-  PIPERACILLIN/TAZOBACTAM: SIGNIFICANT CHANGE UP
-  TIGECYCLINE: SIGNIFICANT CHANGE UP
-  TIGECYCLINE: SIGNIFICANT CHANGE UP
-  TOBRAMYCIN: SIGNIFICANT CHANGE UP
-  TOBRAMYCIN: SIGNIFICANT CHANGE UP
-  TRIMETHOPRIM/SULFAMETHOXAZOLE: SIGNIFICANT CHANGE UP
-  TRIMETHOPRIM/SULFAMETHOXAZOLE: SIGNIFICANT CHANGE UP
CULTURE RESULTS: SIGNIFICANT CHANGE UP
METHOD TYPE: SIGNIFICANT CHANGE UP
METHOD TYPE: SIGNIFICANT CHANGE UP
ORGANISM # SPEC MICROSCOPIC CNT: SIGNIFICANT CHANGE UP
SPECIMEN SOURCE: SIGNIFICANT CHANGE UP

## 2021-01-29 ENCOUNTER — APPOINTMENT (OUTPATIENT)
Dept: UROLOGY | Facility: CLINIC | Age: 81
End: 2021-01-29

## 2021-04-17 NOTE — ED PROVIDER NOTE - CPE EDP ENMT NORM
Regarding: COVID Question  ----- Message from Prieto Garcia sent at 4/17/2021  9:40 AM EDT -----  "My daughter was diagnosed with COVID last Wednesday  Right now, she has a bit of a wet cough, but she's feeling much better   Her grandmother was supposed to watch her today, so we are wondering if she can safely go see her?" normal...

## 2021-04-25 ENCOUNTER — EMERGENCY (EMERGENCY)
Facility: HOSPITAL | Age: 81
LOS: 1 days | Discharge: HOME | End: 2021-04-25
Admitting: EMERGENCY MEDICINE
Payer: MEDICARE

## 2021-04-25 VITALS
TEMPERATURE: 98 F | OXYGEN SATURATION: 95 % | SYSTOLIC BLOOD PRESSURE: 179 MMHG | RESPIRATION RATE: 18 BRPM | HEART RATE: 79 BPM | WEIGHT: 139.99 LBS | DIASTOLIC BLOOD PRESSURE: 79 MMHG | HEIGHT: 73 IN

## 2021-04-25 DIAGNOSIS — N40.1 BENIGN PROSTATIC HYPERPLASIA WITH LOWER URINARY TRACT SYMPTOMS: ICD-10-CM

## 2021-04-25 DIAGNOSIS — Z95.0 PRESENCE OF CARDIAC PACEMAKER: Chronic | ICD-10-CM

## 2021-04-25 DIAGNOSIS — N48.89 OTHER SPECIFIED DISORDERS OF PENIS: ICD-10-CM

## 2021-04-25 DIAGNOSIS — Z88.1 ALLERGY STATUS TO OTHER ANTIBIOTIC AGENTS STATUS: ICD-10-CM

## 2021-04-25 PROCEDURE — 99283 EMERGENCY DEPT VISIT LOW MDM: CPT

## 2021-04-25 NOTE — ED PROVIDER NOTE - NSFOLLOWUPINSTRUCTIONS_ED_ALL_ED_FT
1)  PLEASE FOLLOW-UP WITH YOUR PRIMARY CARE DOCTOR OR REFERRED SPECIALIST IN 1-2 DAYS.     2)  BRING ALL PAPERWORK FROM TODAY'S EMERGENCY ROOM  VISIT TO YOUR FOLLOW-UP VISIT.  IF YOU DO NOT HAVE A PRIMARY CARE DOCTOR PLEASE REFER TO THE OFFICE/CLINIC INFORMATION GIVEN ABOVE.  YOU MAY ALSO CALL [phone] AND ASK FOR MS. BENJAMIN BAR.  SHE CAN HELP YOU MAKE A FOLLOW-UP APPOINTMENT.  HER HOURS ARE 11AM-7PM MONDAY - FRIDAY.    3) PLEASE RETURN TO THE ER IMMEDIATELY OR CALL 911 FOR ANY HIGH FEVER, TROUBLE BREATHING, CHEST PAIN, WEAKNESS, VOMITING, SEVERE PAIN, OR ANY OTHER CONCERNS.

## 2021-04-25 NOTE — ED PROVIDER NOTE - PATIENT PORTAL LINK FT
You can access the FollowMyHealth Patient Portal offered by [facility] by registering at the following website: [email or web address]. By joining [employer]’s FollowMyHealth portal, you will also be able to view your health information using other applications (apps) compatible with our system.

## 2021-04-25 NOTE — ED ADULT NURSE NOTE - NSIMPLEMENTINTERV_GEN_ALL_ED
Implemented All Universal Safety Interventions:  [place] to call system. Call bell, personal items and telephone within reach. Instruct patient to call for assistance. Room bathroom lighting operational. Non-slip footwear when patient is off stretcher. Physically safe environment: no spills, clutter or unnecessary equipment. Stretcher in lowest position, wheels locked, appropriate side rails in place.

## 2021-04-25 NOTE — ED PROVIDER NOTE - OBJECTIVE STATEMENT
80 year old male with indwelling blanco due to BPH, SSS presents to ED for irritation to tip of his penis x couple days. reports blanco since last september changed by urology. this catether placed 4 weeks ago. no burning while urinating.   Denies sore throat, cough, SOB, CP, palpitations, wheezing, abdominal pain, N/V/D/C, change in urinary/bowel function, dysuria, hematuria, flank pain, malaise, rash, HA, and dizziness.  No recent travel or sick contact noted.

## 2021-04-25 NOTE — ED ADULT TRIAGE NOTE - CHIEF COMPLAINT QUOTE
Pt BIBA c/o burning at tip of penis x2 days. Has indwelling catheter placed 4 weeks ago for urinary retention. Denies hematuria, abd pain, n/v/d.

## 2021-04-25 NOTE — ED ADULT NURSE NOTE - ISOLATION TYPE:
"Problem: Patient Care Overview  Goal: Plan of Care/Patient Progress Review    Forks: A&O x4  VS: /45 (BP Location: Left arm)  Pulse 64  Temp 98.4  F (36.9  C) (Oral)  Resp 16  Ht 1.676 m (5' 6\")  Wt 75.3 kg (165 lb 14.4 oz)  SpO2 96%  BMI 26.78 kg/m2  LS: dim on RA  GI: active,  Last BM today  : dialysis T, Th, Sa  Skin: RLE red, receding from marking, +1 edema  Activity: x1, walker, GB   Diet: renal   Pain: c/o 10/10 right leg pain w/movement. Taking oxycodone PRN  Plan: IV ancef, PT/neph, TCU placement               "
"Problem: Patient Care Overview  Goal: Plan of Care/Patient Progress Review    PT:  Stephanie complete. Pt is admitted with RLE cellulitis.  Pt resides alone in a multi level [facility], functions independently at baseline.      Discharge Planner PT   Patient plan for discharge: Patient previously reported being not interested in TCU, now agreeable to TCU stay.  Patient reports that he is \"not able to do what I need to do\" when at home.  Current status: Patient required min A to transfer to sitting at EOB.  Transferred to standing with 2WW and CGA.  Patient reporting immediate onset of severe pain, limited tolerance to RLE weight bearing.  Patient amb 15 feet within room, declined further activity.  Transferred to sitting in bedside chair with legs elevated, right LE on pillow for pain control.    Barriers to return to prior living situation: limited mobility tolerance due to pain, deconditioning, lives alone, stairs  Recommendations for discharge: TCU  Rationale for recommendations: Currently functioning below baseline, will need ongoing skilled PT intervention to improve independence and safety with functional mobility skills prior to returning home.       Entered by: Fannie Barnhart 09/23/2018 5:17 PM           "
Problem: Patient Care Overview  Goal: Plan of Care/Patient Progress Review    PT:  Eval complete. Pt is admitted with RLE cellulitis.  Pt resides alone in a multi level [facility], functions independently at baseline.      Discharge Planner PT   Patient plan for discharge: hoping to return home, but open to TCU as needed, family in support of TCU at discharge  Current status: RLE pain with WB and movement, minimal pain at rest.  Needing FWW to compensate/help offload RLE with standing skills. Amb distances limited to ~20ft due to pain, CGA with transfers and ambulation in room with FWW.  Leg elevated when at rest.  Barriers to return to prior living situation: limited mobility tolerance due to pain, deconditioning, lives alone, stairs  Recommendations for discharge: TCU  Rationale for recommendations: Currently functioning below baseline, will need ongoing skilled PT intervention to improve independence and safety with functional mobility skills prior to returning home.       Entered by: Cas Rivas 09/19/2018 2:51 PM           
Problem: Patient Care Overview  Goal: Plan of Care/Patient Progress Review    VSS. Pain controlled with tramadol and tylenol. RLE redness, warmth, edema. On IV ancef. Plan for dialysis tomorrow. TCU at discharge.      
Problem: Patient Care Overview  Goal: Plan of Care/Patient Progress Review  Ambulatory Status:  Pt up 2 assist with walker and gait belt. Alarms on for safety.  Orientation: a/o   VS:  HR jose at times  Pain:  Right leg pain, denies pain medication.  Resp: LS dim/clear.   GI:  denies nausea.  good appetite, dialysis diet. +BS.  Passing flatus.  Last BM 9/21.  :  Dialysis pt  Skin:  Bruised, skin tear, R knee dressing changed this shift. Cellulitis continues to stay within marked borders.   Tx:  Ancef  Labs:  Cr 6.18, CRP 18.5, HGB 9.8  Consults:  Pt, Nephrology and SW  Disposition:  tbd         
Problem: Patient Care Overview  Goal: Plan of Care/Patient Progress Review  Ambulatory Status:  Pt up A1   VS:  hypotension  Pain:  Pain only when walking or touching RLE, declined pain medication  Resp: LS clear  GI:  denies nausea.  Renal diet.  BS hypoactive.  Passing flatus.  Last BM 9/22.  :  Makes little urine, patient is a dialysis patient (Tuesday, Thursday, Saturday)  Skin:  Cellulitis RLE  Tx:  ancef  Consults:  Nephrology, ortho, PT, SW  Disposition:  To TCU when ready for discharge.        
Problem: Patient Care Overview  Goal: Plan of Care/Patient Progress Review  Ambulatory Status:  Pt up A1  VS:  vss  Pain:  Patient declined pain medication, only having pain when moving  Resp: LS clear  GI:  denies nausea.  Renal diet.  BS active.  Passing flatus.  Last BM 9/22.  :  Produces very little urine, patient on dialysis.  Skin: cellulitis in RLE, dialysis shunt in right arm  Tx:  ancef  Consults:  Ortho, nephrology, PT  Disposition:  tbd        
Problem: Patient Care Overview  Goal: Plan of Care/Patient Progress Review  Ambulatory Status:  Pt up A1 and walker  VS:  hypotension  Pain:  Denies pain unless moving, declined pain medication tonight  Resp: LS diminished on RA  GI:  denies nausea.  Renal diet.  BS hypoactive.  Passing flatus.  Last BM 9/21.  :  Patient is dialysis patient, makes very little urine  Skin:  Cellulitis RLE   Tx:  ancef  Consults:  Nephrology, PT, Ortho, SW  Disposition:  tbd    Patient is scheduled for dialysis today.        
Problem: Patient Care Overview  Goal: Plan of Care/Patient Progress Review  Outcome: Improving  Alert and oriented.   Ax1 w/ gait belt and walker.   VSS. 94% on RA.  Denies SOB.   No documented bm in a few days. Senna and Miralax scheduled.   RLE red and warm to touch.  2+ edema. Painful with movement.  Redness remains within marks.   C/o  RLE pain.  Declines interventions.  Voltaren gel and lidocaine ordered as scheduled.    Went down for dialysis today.   Plan for discharge this afternoon to TCU.   Will continue to monitor.        
Problem: Patient Care Overview  Goal: Plan of Care/Patient Progress Review  Outcome: Improving  Patient alert and oriented. Assist of one with walker and gait belt.   C/O 10/10 pain when standing on right leg. Denies pain when not moving.   Redness is receeding from markings. Leg is warm. Dressing changed  Fistula is dressed.   Plan to continue Abx, Working with PT.  Dialysis T, H, and Sat. Baseline.   Metoprolol held due to soft BP and bradycardia.         
Problem: Patient Care Overview  Goal: Plan of Care/Patient Progress Review  Outcome: No Change  A/O x 4 but forgetful. Denies pain this shift. RLE redness, warmth, edema. On IV ancef. Plan for dialysis tomorrow. TCU at discharge.         
Problem: Patient Care Overview  Goal: Plan of Care/Patient Progress Review  Outcome: No Change  Ambulatory Status:  Pt up 1 assist with walker and gait belt. Alarms on for safety. Up in chair most of shift. Right leg elevated   Orientation: a/o   VS: Tmax 99.2   Pain:  Right leg pain, denies pain medication.  Resp: LS dim/clear.   GI:  denies nausea.  good appetite, dialysis diet. +BS.  Passing flatus.  Last BM 9/22.  :  no void this shift; dialysis pt   Skin:  Bruised, skin tear, R knee dressing changed this shift. Cellulitis continues to stay within marked borders.   Tx:  Ancef  Consults:  Pt, Nephrology, Ortho and SW  Disposition:  tbd       
Problem: Patient Care Overview  Goal: Plan of Care/Patient Progress Review  Outcome: No Change  Pt A/O, Alarm on for safety. C/O pain to RLE, denies pain medications. HR reg, murmur present. RA, no SOB, lungs dim. BS+, abdomen round, no N/V, tolerating diet, Senna held due to loose stools, LB M in AM. Dialysis today, 2L Off, fistula to RUE with bruit and thrill. PIV to L arm, RLE is warm and marked, dressing in place CDI. Tear to RUE with transparent dressing. VSS.      
Problem: Patient Care Overview  Goal: Plan of Care/Patient Progress Review  Outcome: No Change  Pt VSS  Denies pain  Ambulating in the room with no difficulty  Dressing to leg wound clean dry and intact  Ortho consulted, no new recommendations  Redness remains inside the borders of markings  Plan to continue IV antibiotics at this time   Plan for regularly scheduled dialysis tomorrow      
Problem: Patient Care Overview  Goal: Plan of Care/Patient Progress Review  Outcome: No Change  Pt VSS  Had HD today with no difficulty  Dressing  Changed to the leg today, less tender then yesterday  Continues with IV ancef at this time        
Problem: Patient Care Overview  Goal: Plan of Care/Patient Progress Review  Outcome: No Change  Pt admitted this shift for RLE cellulitis. IV ancef scheduled. Dialysis tues, thurs, and Saturday. Dialysis diet, tolerating well. SBA, no dizziness. RLE hot, painful, and swollen. Ortho consulted. Swelling marked, has not exceeded lines this shift. Nursing will continue to monitor.       
Problem: Patient Care Overview  Goal: Plan of Care/Patient Progress Review  Outcome: No Change  Pt hospitalized for pancreatitis. NPO, but non complaint. Pt was seen drinking coffee, educated on why it is important to be competent in not drinking or eating. Independent, frequently going outside. Pts SO admitted that they have been smoking. Nicotine patch was removed, inhaler and cartridges requested. LR @ 100ml/hr. VSS. Discharge plan 2-3 days. Nursing will continue to monitor.       
Problem: Patient Care Overview  Goal: Plan of Care/Patient Progress Review  PT attempted to see pt at this time, but patient refused because he was just starting a meal.       
Problem: Patient Care Overview  Goal: Plan of Care/Patient Progress Review  PT- attempted to see not willing to do session as his leg is to sore. Did elevate on additional pillow. Will plan to see tomorrow as able.      
Problem: Patient Care Overview  Goal: Plan of Care/Patient Progress Review  PT- attempted to see was off the floor. Per notes plans for TCU today. Will check back as able.    Pt- now planned discharge to TCU. Goals per chart review were not met. Will recommend to PT for formal discharge from PT services at this time.  
Problem: Patient Care Overview  Goal: Plan of Care/Patient Progress Review  PT: Treatment attempted. Patient very politely declines PT today. He reports too much pain with ambulation to try today. Encouraged the benefits of OOB mobility. Pt continues to pleasantly request check back tomorrow.       
Problem: Patient Care Overview  Goal: Plan of Care/Patient Progress Review  PT: Tx attempted this AM, pt in dialysis. Will attempt again in PM as time allows.      
Problem: Patient Care Overview  Goal: Plan of Care/Patient Progress Review  Pt slept through shift, denies pain and stated RLE feels much better tonight, redness\swelling\ warmth  Improved and within markings still, wound dressing CDI and not changed per verbal report MD approved once a day \ PRN dressing changes-AM nurse to clarify orders. SBA for mobility, without urination this shift per baseline, PIV saline locked, BLE +1 edema. Expected discharge 2-4 days per MD, continues IV Ancef daily.       
Problem: Patient Care Overview  Goal: Plan of Care/Patient Progress Review  Pt slept through shift, denies pain except tender RLE  with wound cares-declines  Interventions, RLE still red/hot/swollen with wound below knee and within markings. VSS except soft BPs and bradycardia-consistent through stay. Assist of 1 for mobility, due for dialysis today with orders held for procedure, PIV saline locked, denies need for urination per baseline. Discharge 2-3 days per MD for continued  IV abx.       
Problem: Patient Care Overview  Goal: Plan of Care/Patient Progress Review  Pt slept through shift, denies pain, VSS, RLE red/hot/swollen with wound below knee-reports sensitivity with touch to extremity but denies interventions overnight-kept elevated through overnight. Without void overnight and pt reports little urination related to dialysis. Fistula noted to RUE and thrill/bruit present and dressing CDI. PIV saline locked, continues IV Ancef Q12H. Expected discharge 2+ days due to need for IV Abx per MD.       
Problem: Skin and Soft Tissue Infection (Adult)  Goal: Signs and Symptoms of Listed Potential Problems Will be Absent, Minimized or Managed (Skin and Soft Tissue Infection)  Signs and symptoms of listed potential problems will be absent, minimized or managed by discharge/transition of care (reference Skin and Soft Tissue Infection (Adult) CPG).   Outcome: Improving  Skin to right lower extremity remains reddened and tender to touch, 2+ edema. Patient vitals stable, afebrile. Managing pain with scheduled tylenol. No nausea, dizziness. Is expected to discharge to TCU today.       
Pt discharged to TCU via family transportation. AVS reviewed, copy for pt and facility given to pt. Prescription for tramadol sent in packet with pt. IV removed CDI. Belongings sent with pt.   
Pt up Ax1 with walker. LS clear/dim. BS hypo. Tolerating renal diet, appetite good, denies nausea. PIV SL. RUE dialysis shunt. R knee drsg changed on days. RLE red, swollen, very tender to touch. Denied wanting pain meds.   
Pt up Ax1-2. VSS BPs soft. LS clear. BS hypo. RLE red w/t borders, very tender to touch, swollen. Took oxy 2.5mg x1. States pain with movement & touch. Abrasion to R knee, drsg covering. Denies N/T. Elevated on pillows. R arm fistula, drsg intact. Hasn't voided this shift. PIV SL. Tolerating renal diet, denies nausea, appetite good.   
None

## 2021-04-25 NOTE — ED PROVIDER NOTE - PHYSICAL EXAMINATION
GEN: Well appearing, well nourished, awake, alert, oriented to person, place, time/situation and in no apparent distress.  ENT: Airway patent, Nasal mucosa clear. Mouth with normal mucosa.  EYES: Clear bilaterally.  RESPIRATORY: Breathing comfortably with normal RR.  : chapereoned by RN - tip of penis with no erythema, no TTP, blanco in place, draining clear yellow urine  MSK: Range of motion is not limited, no deformities noted.  NEURO: Alert and oriented, no focal deficits.  SKIN: Skin normal color for race, warm, dry and intact. No evidence of rash.  PSYCH: Alert and oriented to person, place, time/situation. normal mood and affect. no apparent risk to self or others.

## 2021-04-26 NOTE — ED PROVIDER NOTE - GASTROINTESTINAL NEGATIVE STATEMENT, MLM
26-Apr-2021 11:40
no abdominal pain, no bloating, no constipation, no diarrhea, no nausea and no vomiting.

## 2021-06-03 ENCOUNTER — EMERGENCY (EMERGENCY)
Facility: HOSPITAL | Age: 81
LOS: 1 days | Discharge: HOME | End: 2021-06-03
Attending: EMERGENCY MEDICINE | Admitting: EMERGENCY MEDICINE
Payer: MEDICARE

## 2021-06-03 VITALS
SYSTOLIC BLOOD PRESSURE: 164 MMHG | HEART RATE: 86 BPM | RESPIRATION RATE: 18 BRPM | HEIGHT: 73 IN | OXYGEN SATURATION: 95 % | DIASTOLIC BLOOD PRESSURE: 88 MMHG | TEMPERATURE: 98 F | WEIGHT: 149.91 LBS

## 2021-06-03 DIAGNOSIS — Z95.0 PRESENCE OF CARDIAC PACEMAKER: ICD-10-CM

## 2021-06-03 DIAGNOSIS — Z88.1 ALLERGY STATUS TO OTHER ANTIBIOTIC AGENTS STATUS: ICD-10-CM

## 2021-06-03 DIAGNOSIS — R10.9 UNSPECIFIED ABDOMINAL PAIN: ICD-10-CM

## 2021-06-03 DIAGNOSIS — Z95.0 PRESENCE OF CARDIAC PACEMAKER: Chronic | ICD-10-CM

## 2021-06-03 DIAGNOSIS — N40.1 BENIGN PROSTATIC HYPERPLASIA WITH LOWER URINARY TRACT SYMPTOMS: ICD-10-CM

## 2021-06-03 DIAGNOSIS — B02.9 ZOSTER WITHOUT COMPLICATIONS: ICD-10-CM

## 2021-06-03 LAB
ALBUMIN SERPL ELPH-MCNC: 3.3 G/DL — LOW (ref 3.4–5)
ALP SERPL-CCNC: 79 U/L — SIGNIFICANT CHANGE UP (ref 40–120)
ALT FLD-CCNC: 12 U/L — SIGNIFICANT CHANGE UP (ref 12–42)
ANION GAP SERPL CALC-SCNC: 8 MMOL/L — LOW (ref 9–16)
APPEARANCE UR: CLEAR — SIGNIFICANT CHANGE UP
AST SERPL-CCNC: 23 U/L — SIGNIFICANT CHANGE UP (ref 15–37)
BACTERIA # UR AUTO: ABNORMAL /HPF
BASOPHILS # BLD AUTO: 0.04 K/UL — SIGNIFICANT CHANGE UP (ref 0–0.2)
BASOPHILS NFR BLD AUTO: 1 % — SIGNIFICANT CHANGE UP (ref 0–2)
BILIRUB SERPL-MCNC: 0.5 MG/DL — SIGNIFICANT CHANGE UP (ref 0.2–1.2)
BILIRUB UR-MCNC: NEGATIVE — SIGNIFICANT CHANGE UP
BUN SERPL-MCNC: 23 MG/DL — SIGNIFICANT CHANGE UP (ref 7–23)
CALCIUM SERPL-MCNC: 9.2 MG/DL — SIGNIFICANT CHANGE UP (ref 8.5–10.5)
CHLORIDE SERPL-SCNC: 101 MMOL/L — SIGNIFICANT CHANGE UP (ref 96–108)
CK SERPL-CCNC: 90 U/L — SIGNIFICANT CHANGE UP (ref 39–308)
CO2 SERPL-SCNC: 30 MMOL/L — SIGNIFICANT CHANGE UP (ref 22–31)
COLOR SPEC: YELLOW — SIGNIFICANT CHANGE UP
CREAT SERPL-MCNC: 0.87 MG/DL — SIGNIFICANT CHANGE UP (ref 0.5–1.3)
DIFF PNL FLD: ABNORMAL
EOSINOPHIL # BLD AUTO: 0.07 K/UL — SIGNIFICANT CHANGE UP (ref 0–0.5)
EOSINOPHIL NFR BLD AUTO: 1.8 % — SIGNIFICANT CHANGE UP (ref 0–6)
EPI CELLS # UR: ABNORMAL /HPF (ref 0–5)
GLUCOSE SERPL-MCNC: 106 MG/DL — HIGH (ref 70–99)
GLUCOSE UR QL: NEGATIVE — SIGNIFICANT CHANGE UP
HCT VFR BLD CALC: 36.4 % — LOW (ref 39–50)
HGB BLD-MCNC: 12.1 G/DL — LOW (ref 13–17)
IMM GRANULOCYTES NFR BLD AUTO: 0.3 % — SIGNIFICANT CHANGE UP (ref 0–1.5)
KETONES UR-MCNC: NEGATIVE — SIGNIFICANT CHANGE UP
LACTATE SERPL-SCNC: 0.8 MMOL/L — SIGNIFICANT CHANGE UP (ref 0.4–2)
LEUKOCYTE ESTERASE UR-ACNC: ABNORMAL
LIDOCAIN IGE QN: 107 U/L — SIGNIFICANT CHANGE UP (ref 73–393)
LYMPHOCYTES # BLD AUTO: 0.43 K/UL — LOW (ref 1–3.3)
LYMPHOCYTES # BLD AUTO: 11.2 % — LOW (ref 13–44)
MAGNESIUM SERPL-MCNC: 2 MG/DL — SIGNIFICANT CHANGE UP (ref 1.6–2.6)
MANUAL SMEAR VERIFICATION: SIGNIFICANT CHANGE UP
MCHC RBC-ENTMCNC: 30.6 PG — SIGNIFICANT CHANGE UP (ref 27–34)
MCHC RBC-ENTMCNC: 33.2 GM/DL — SIGNIFICANT CHANGE UP (ref 32–36)
MCV RBC AUTO: 92.2 FL — SIGNIFICANT CHANGE UP (ref 80–100)
MONOCYTES # BLD AUTO: 0.66 K/UL — SIGNIFICANT CHANGE UP (ref 0–0.9)
MONOCYTES NFR BLD AUTO: 17.2 % — HIGH (ref 2–14)
NEUTROPHILS # BLD AUTO: 2.63 K/UL — SIGNIFICANT CHANGE UP (ref 1.8–7.4)
NEUTROPHILS NFR BLD AUTO: 68.5 % — SIGNIFICANT CHANGE UP (ref 43–77)
NITRITE UR-MCNC: NEGATIVE — SIGNIFICANT CHANGE UP
NRBC # BLD: 0 /100 WBCS — SIGNIFICANT CHANGE UP (ref 0–0)
NT-PROBNP SERPL-SCNC: 742 PG/ML — HIGH
PH UR: 6 — SIGNIFICANT CHANGE UP (ref 5–8)
PLAT MORPH BLD: NORMAL — SIGNIFICANT CHANGE UP
PLATELET # BLD AUTO: 205 K/UL — SIGNIFICANT CHANGE UP (ref 150–400)
POTASSIUM SERPL-MCNC: 4.3 MMOL/L — SIGNIFICANT CHANGE UP (ref 3.5–5.3)
POTASSIUM SERPL-SCNC: 4.3 MMOL/L — SIGNIFICANT CHANGE UP (ref 3.5–5.3)
PROT SERPL-MCNC: 7 G/DL — SIGNIFICANT CHANGE UP (ref 6.4–8.2)
PROT UR-MCNC: 30 MG/DL
RBC # BLD: 3.95 M/UL — LOW (ref 4.2–5.8)
RBC # FLD: 14.9 % — HIGH (ref 10.3–14.5)
RBC BLD AUTO: NORMAL — SIGNIFICANT CHANGE UP
RBC CASTS # UR COMP ASSIST: ABNORMAL /HPF
SODIUM SERPL-SCNC: 139 MMOL/L — SIGNIFICANT CHANGE UP (ref 132–145)
SP GR SPEC: 1.02 — SIGNIFICANT CHANGE UP (ref 1–1.03)
TROPONIN I SERPL-MCNC: <0.017 NG/ML — LOW (ref 0.02–0.06)
UROBILINOGEN FLD QL: 0.2 E.U./DL — SIGNIFICANT CHANGE UP
WBC # BLD: 3.84 K/UL — SIGNIFICANT CHANGE UP (ref 3.8–10.5)
WBC # FLD AUTO: 3.84 K/UL — SIGNIFICANT CHANGE UP (ref 3.8–10.5)
WBC UR QL: > 10 /HPF

## 2021-06-03 PROCEDURE — 99285 EMERGENCY DEPT VISIT HI MDM: CPT

## 2021-06-03 PROCEDURE — 93010 ELECTROCARDIOGRAM REPORT: CPT

## 2021-06-03 NOTE — ED PROVIDER NOTE - OBJECTIVE STATEMENT
79 yo M, hx of BPH, with chronic indwelling blanco, SSS with pacemaker, ambulates independently, presents with L sided flank pain radiating into LUQ. denies associated N/V/D, constipation, hematuria, dysuria, urinary retention, or fever, chills. denies trauma. denies rash, denies cp, sob or palpitations. denies syncope. notes he went to his chiropractor today, as he has chronic neck and back pain, and treatment didn't help his pain. denies falls. denies taking anything for the pain. denies issues with lbanco. nonsmoker, denies ETOH or drugs.

## 2021-06-03 NOTE — ED PROVIDER NOTE - GASTROINTESTINAL NEGATIVE STATEMENT, MLM
no abdominal pain, no bloating, no constipation, no diarrhea, no nausea and no vomiting. - L flank pain

## 2021-06-03 NOTE — ED PROVIDER NOTE - PATIENT PORTAL LINK FT
You can access the FollowMyHealth Patient Portal offered by [facility] by registering at the following website: [email or web address]. By joining [employer]’s FollowMyHealth portal, you will also be able to view your health information using other applications (apps) compatible with our system.

## 2021-06-03 NOTE — ED PROVIDER NOTE - CLINICAL SUMMARY MEDICAL DECISION MAKING FREE TEXT BOX
plan to check all labs, and cardiac/abd labs, ct abd/p with iv contrast, ua, rule out uti. appears nontoxic, afebrile.

## 2021-06-03 NOTE — ED PROVIDER NOTE - SKIN RASH DESCRIPTION
patchy dermatomal vesicular rash with raised cropped region to dermatome R L1. no streaking, fluctuance or induration, sparing genital region./RAISED/REDDENED

## 2021-06-03 NOTE — ED PROVIDER NOTE - PROGRESS NOTE DETAILS
patient requesting home services evaluation, lives in 5 floor walk up. notes he is independent, lives alone, and receives meals on wheels. ambulatory in ed with no assistance. email sent to sommer sullivan.

## 2021-06-04 VITALS
TEMPERATURE: 98 F | RESPIRATION RATE: 16 BRPM | SYSTOLIC BLOOD PRESSURE: 117 MMHG | HEART RATE: 75 BPM | OXYGEN SATURATION: 97 % | DIASTOLIC BLOOD PRESSURE: 71 MMHG

## 2021-06-04 PROCEDURE — 74177 CT ABD & PELVIS W/CONTRAST: CPT | Mod: 26,MH

## 2021-06-04 RX ORDER — TRAMADOL HYDROCHLORIDE 50 MG/1
25 TABLET ORAL ONCE
Refills: 0 | Status: DISCONTINUED | OUTPATIENT
Start: 2021-06-04 | End: 2021-06-04

## 2021-06-04 RX ORDER — KETOROLAC TROMETHAMINE 30 MG/ML
15 SYRINGE (ML) INJECTION ONCE
Refills: 0 | Status: DISCONTINUED | OUTPATIENT
Start: 2021-06-04 | End: 2021-06-04

## 2021-06-04 RX ORDER — IBUPROFEN 200 MG
600 TABLET ORAL ONCE
Refills: 0 | Status: COMPLETED | OUTPATIENT
Start: 2021-06-04 | End: 2021-06-04

## 2021-06-04 RX ORDER — VALACYCLOVIR 500 MG/1
1 TABLET, FILM COATED ORAL
Qty: 10 | Refills: 0
Start: 2021-06-04 | End: 2021-06-08

## 2021-06-04 RX ORDER — VALACYCLOVIR 500 MG/1
1000 TABLET, FILM COATED ORAL ONCE
Refills: 0 | Status: COMPLETED | OUTPATIENT
Start: 2021-06-04 | End: 2021-06-04

## 2021-06-04 RX ORDER — ACETAMINOPHEN 500 MG
975 TABLET ORAL ONCE
Refills: 0 | Status: COMPLETED | OUTPATIENT
Start: 2021-06-04 | End: 2021-06-04

## 2021-06-04 RX ORDER — ACETAMINOPHEN 500 MG
2 TABLET ORAL
Qty: 36 | Refills: 0
Start: 2021-06-04 | End: 2021-06-06

## 2021-06-04 RX ADMIN — Medication 15 MILLIGRAM(S): at 02:50

## 2021-06-04 RX ADMIN — Medication 975 MILLIGRAM(S): at 04:29

## 2021-06-04 RX ADMIN — Medication 600 MILLIGRAM(S): at 04:29

## 2021-06-04 RX ADMIN — TRAMADOL HYDROCHLORIDE 25 MILLIGRAM(S): 50 TABLET ORAL at 02:50

## 2021-06-04 RX ADMIN — VALACYCLOVIR 1000 MILLIGRAM(S): 500 TABLET, FILM COATED ORAL at 04:29

## 2021-06-04 NOTE — ED ADULT NURSE REASSESSMENT NOTE - NS ED NURSE REASSESS COMMENT FT1
Pt not given pain medication when scheduled because pt sleeping. Pt awoke at this time and requested his medication. MAR would not allow to scan medicine. Had MARCY Poole witness the medication administration.

## 2021-06-04 NOTE — ED POST DISCHARGE NOTE - DETAILS
Per the notes the patient does not have any skilled need for home care service, so a referral can not be put in. Also, per the patients medicaid he is restricted to [facility] [place] for services and community care management. Patient was left a message explaining this as well as instructed reach out the his medicaid provider of [facility] as well as his pcp for additional assistance. Provider was sent an email as well. 6/9/21 @ 10:29 am.  Attempted to call but voicemail is full.  Pt has + urine culture with 2 bacterial growths, Needs Cipro Rx. 6/10/21@[facility] - Attempted but VM still full.  Will send certified letter.

## 2021-06-04 NOTE — ED POST DISCHARGE NOTE - ADDITIONAL DOCUMENTATION
Patient was also provided with SW contact information should he have any further questions. Patient was also provided with SW contact information should he have any further questions. Update: 3:37pm patient called screaming at this worker and ED staff. Both ED staff and this worker tried to communicate with the patient on what he wanted/needed, but he kept screaming not letting either speak. Patients Case delbert Casillas ([phone] ) from [facility] was notified about the situation and an APS report was put in for the patient out of a concern for his well being.

## 2021-06-04 NOTE — ED ADULT NURSE REASSESSMENT NOTE - NS ED NURSE REASSESS COMMENT FT1
Pt received from [facility]. Pt repeatedly getting out of bed to state he is cold and the stretcher hurts his back. Explained to pt plan of care, placed him back in bed. Breathing spontaneous and nonlabored, ambulatory with steady gait.

## 2021-06-07 LAB
-  AMIKACIN: SIGNIFICANT CHANGE UP
-  AZTREONAM: SIGNIFICANT CHANGE UP
-  CEFEPIME: SIGNIFICANT CHANGE UP
-  CEFTAZIDIME: SIGNIFICANT CHANGE UP
-  CIPROFLOXACIN: SIGNIFICANT CHANGE UP
-  GENTAMICIN: SIGNIFICANT CHANGE UP
-  IMIPENEM: SIGNIFICANT CHANGE UP
-  LEVOFLOXACIN: SIGNIFICANT CHANGE UP
-  MEROPENEM: SIGNIFICANT CHANGE UP
-  PIPERACILLIN/TAZOBACTAM: SIGNIFICANT CHANGE UP
-  TOBRAMYCIN: SIGNIFICANT CHANGE UP
METHOD TYPE: SIGNIFICANT CHANGE UP

## 2021-06-08 LAB
-  AMIKACIN: SIGNIFICANT CHANGE UP
-  AMOXICILLIN/CLAVULANIC ACID: SIGNIFICANT CHANGE UP
-  AMPICILLIN/SULBACTAM: SIGNIFICANT CHANGE UP
-  AMPICILLIN: SIGNIFICANT CHANGE UP
-  AZTREONAM: SIGNIFICANT CHANGE UP
-  CEFAZOLIN: SIGNIFICANT CHANGE UP
-  CEFEPIME: SIGNIFICANT CHANGE UP
-  CEFOXITIN: SIGNIFICANT CHANGE UP
-  CEFTRIAXONE: SIGNIFICANT CHANGE UP
-  CIPROFLOXACIN: SIGNIFICANT CHANGE UP
-  ERTAPENEM: SIGNIFICANT CHANGE UP
-  GENTAMICIN: SIGNIFICANT CHANGE UP
-  IMIPENEM: SIGNIFICANT CHANGE UP
-  LEVOFLOXACIN: SIGNIFICANT CHANGE UP
-  MEROPENEM: SIGNIFICANT CHANGE UP
-  NITROFURANTOIN: SIGNIFICANT CHANGE UP
-  PIPERACILLIN/TAZOBACTAM: SIGNIFICANT CHANGE UP
-  TIGECYCLINE: SIGNIFICANT CHANGE UP
-  TOBRAMYCIN: SIGNIFICANT CHANGE UP
-  TRIMETHOPRIM/SULFAMETHOXAZOLE: SIGNIFICANT CHANGE UP
CULTURE RESULTS: SIGNIFICANT CHANGE UP
METHOD TYPE: SIGNIFICANT CHANGE UP
ORGANISM # SPEC MICROSCOPIC CNT: SIGNIFICANT CHANGE UP
SPECIMEN SOURCE: SIGNIFICANT CHANGE UP

## 2021-06-19 ENCOUNTER — EMERGENCY (EMERGENCY)
Facility: HOSPITAL | Age: 81
LOS: 1 days | Discharge: HOME | End: 2021-06-19
Admitting: EMERGENCY MEDICINE
Payer: MEDICARE

## 2021-06-19 VITALS
RESPIRATION RATE: 18 BRPM | OXYGEN SATURATION: 99 % | SYSTOLIC BLOOD PRESSURE: 149 MMHG | TEMPERATURE: 98 F | DIASTOLIC BLOOD PRESSURE: 77 MMHG | HEART RATE: 65 BPM

## 2021-06-19 VITALS
DIASTOLIC BLOOD PRESSURE: 81 MMHG | SYSTOLIC BLOOD PRESSURE: 129 MMHG | HEIGHT: 73 IN | RESPIRATION RATE: 18 BRPM | OXYGEN SATURATION: 99 % | HEART RATE: 84 BPM | TEMPERATURE: 98 F

## 2021-06-19 DIAGNOSIS — B02.23 POSTHERPETIC POLYNEUROPATHY: ICD-10-CM

## 2021-06-19 DIAGNOSIS — N40.1 BENIGN PROSTATIC HYPERPLASIA WITH LOWER URINARY TRACT SYMPTOMS: ICD-10-CM

## 2021-06-19 DIAGNOSIS — Z95.0 PRESENCE OF CARDIAC PACEMAKER: ICD-10-CM

## 2021-06-19 DIAGNOSIS — Z88.1 ALLERGY STATUS TO OTHER ANTIBIOTIC AGENTS STATUS: ICD-10-CM

## 2021-06-19 DIAGNOSIS — Z95.0 PRESENCE OF CARDIAC PACEMAKER: Chronic | ICD-10-CM

## 2021-06-19 DIAGNOSIS — Z87.891 PERSONAL HISTORY OF NICOTINE DEPENDENCE: ICD-10-CM

## 2021-06-19 DIAGNOSIS — R21 RASH AND OTHER NONSPECIFIC SKIN ERUPTION: ICD-10-CM

## 2021-06-19 PROCEDURE — 99283 EMERGENCY DEPT VISIT LOW MDM: CPT

## 2021-06-19 RX ORDER — LIDOCAINE HCL 20 MG/ML
5 VIAL (ML) INJECTION ONCE
Refills: 0 | Status: COMPLETED | OUTPATIENT
Start: 2021-06-19 | End: 2021-06-19

## 2021-06-19 RX ORDER — GABAPENTIN 400 MG/1
100 CAPSULE ORAL ONCE
Refills: 0 | Status: COMPLETED | OUTPATIENT
Start: 2021-06-19 | End: 2021-06-19

## 2021-06-19 RX ADMIN — GABAPENTIN 100 MILLIGRAM(S): 400 CAPSULE ORAL at 05:02

## 2021-06-19 NOTE — ED ADULT NURSE NOTE - CHIEF COMPLAINT QUOTE
Scribe Attestation (For Scribes USE Only)... pt picked up from rehab facility for rash to buttocks. Scribe Attestation (For Scribes USE Only).../Attending Attestation (For Attendings USE Only)...

## 2021-06-19 NOTE — ED PROVIDER NOTE - NSFOLLOWUPINSTRUCTIONS_ED_ALL_ED_FT
Shingles    WHAT YOU NEED TO KNOW:    Shingles is a painful rash. Shingles is caused by the same virus that causes chickenpox (varicella-zoster). After you get chickenpox, the virus stays in your body for several years without causing any symptoms. Shingles occurs when the virus becomes active again. The active virus travels along a nerve to your skin and causes a rash.    Shingles         DISCHARGE INSTRUCTIONS:    Call your local emergency number (911 in the ) if:   •You have trouble moving your arms, legs, or face.      •You become confused, or have difficulty speaking.      •You have a seizure.      Seek care immediately if:   •You have weakness in an arm or leg.      •You have dizziness, a severe headache, or hearing or vision loss.      •You have painful, red, warm skin around the blisters, or the blisters drain pus.      •Your neck is stiff or you have trouble moving it.      Call your doctor if:   •You feel weak or have a headache.      •You have a cough, chills, or a fever.      •You have abdominal pain or nausea, or you are vomiting.      •Your rash becomes more itchy or painful.      •Your rash spreads to other parts of your body.      •Your pain worsens and does not go away even after you take medicine.      •You have questions or concerns about your condition or care.      Medicines: You may need any of the following:  •Antiviral medicine helps decrease symptoms and healing time. It may also decrease your risk for nerve pain. You will need to start taking this medicine within 3 days of the start of symptoms to prevent nerve pain.      •Prescription pain medicine may be given. Ask your healthcare provider how to take this medicine safely. Some prescription pain medicines contain acetaminophen. Do not take other medicines that contain acetaminophen without talking to your healthcare provider. Too much acetaminophen may cause liver damage. Prescription pain medicine may cause constipation. Ask your healthcare provider how to prevent or treat constipation.       •Acetaminophen decreases pain and fever. It is available without a doctor's order. Ask how much to take and how often to take it. Follow directions. Read the labels of all other medicines you are using to see if they also contain acetaminophen, or ask your doctor or pharmacist. Acetaminophen can cause liver damage if not taken correctly. Do not use more than 4 grams (4,000 milligrams) total of acetaminophen in one day.       •NSAIDs, such as ibuprofen, help decrease swelling, pain, and fever. This medicine is available with or without a doctor's order. NSAIDs can cause stomach bleeding or kidney problems in certain people. If you take blood thinner medicine, always ask if NSAIDs are safe for you. Always read the medicine label and follow directions. Do not give these medicines to children under 6 months of age without direction from your child's healthcare provider.      •Topical anesthetics are used to numb the skin and decrease pain. They can be a cream, gel, spray, or patch.       •Anticonvulsants decrease nerve pain and may help you sleep at night.      •Antidepressants may be used to decrease nerve pain.      •Take your medicine as directed. Contact your healthcare provider if you think your medicine is not helping or if you have side effects. Tell him or her if you are allergic to any medicine. Keep a list of the medicines, vitamins, and herbs you take. Include the amounts, and when and why you take them. Bring the list or the pill bottles to follow-up visits. Carry your medicine list with you in case of an emergency.      Self-care: Keep your rash clean and dry. Cover your rash with a bandage or clothing. Do not use bandages that stick to your skin. The sticky part may irritate your skin and make your rash last longer.    Prevent the spread of the germs:          •Wash your hands often. Wash your hands several times each day. Wash after you use the bathroom, change a child's diaper, and before you prepare or eat food. Use soap and water every time. Rub your soapy hands together, lacing your fingers. Wash the front and back of your hands, and in between your fingers. Use the fingers of one hand to scrub under the fingernails of the other hand. Wash for at least 20 seconds. Rinse with warm, running water for several seconds. Then dry your hands with a clean towel or paper towel. Use hand  that contains alcohol if soap and water are not available. Do not touch your eyes, nose, or mouth without washing your hands first.  Handwashing           •Cover a sneeze or cough. Use a tissue that covers your mouth and nose. Throw the tissue away in a trash can right away. Use the bend of your arm if a tissue is not available. Wash your hands well with soap and water or use a hand .      •Stay away from others while you are sick. Avoid crowds as much as possible.      •Ask about vaccines you may need. Talk to your healthcare provider about your vaccine history. He or she will tell you which vaccines you need, and when to get them.      Prevent shingles or another shingles outbreak: A vaccine may be given to help prevent shingles. You can get the vaccine even if you already had shingles. The vaccine can help prevent a future outbreak. If you do get shingles again, the vaccine can keep it from becoming severe. The vaccine comes in 2 forms. Your healthcare provider will tell you which form is right for you. The decision is based on your age and any medical conditions you have. A 2-dose vaccine is usually given to adults 50 years or older. A 1-dose vaccine may be given to adults 60 years or older.    Follow up with your doctor as directed: Write down your questions so you remember to ask them during your visits.

## 2021-06-19 NOTE — ED ADULT NURSE NOTE - NSIMPLEMENTINTERV_GEN_ALL_ED
Implemented All Fall Risk Interventions:  [place] to call system. Call bell, personal items and telephone within reach. Instruct patient to call for assistance. Room bathroom lighting operational. Non-slip footwear when patient is off stretcher. Physically safe environment: no spills, clutter or unnecessary equipment. Stretcher in lowest position, wheels locked, appropriate side rails in place. Provide visual cue, wrist band, yellow gown, etc. Monitor gait and stability. Monitor for mental status changes and reorient to person, place, and time. Review medications for side effects contributing to fall risk. Reinforce activity limits and safety measures with patient and family.

## 2021-06-19 NOTE — ED PROVIDER NOTE - OBJECTIVE STATEMENT
79 yo M, poor historian, hx of BPH, SSS with pacemaker, ambulates independently, was sent from [facility] to rehab, presenting c/o itching and burning sensation to the L buttock/groin rash.  Pt reports having rash to the region x 1-2 wks, seen at [facility] and given unknown medication.  Was given some "white topical cream tonight at rehab which made my rash worse and now it's itchy and burning." Denies fever, chills, SOB, CP, palpitations, wheezing, stridors, tongue/lip swelling, focal weakness, HA, dizziness, N/V/D/C, oral/genital lesions, cough, paresthesia, numbness, tingling, malaise, and diaphoresis.

## 2021-06-19 NOTE — ED PROVIDER NOTE - CLINICAL SUMMARY MEDICAL DECISION MAKING FREE TEXT BOX
AFVSS at time of d/c, pt non-toxic appearing, results, ddx, and f/u plans discussed with pt at bedside, d/c'd home to f/u with PMD, strict return precautions discussed, prompt return to ER for any worsening or new sx, pt verbalized understanding. AFVSS at time of d/c, pt non-toxic appearing, rash appears to be shingle in healing stage, no s/s of superimposed bacterial infection noted, given lidocaine topical ointment, pain adequately controlled, encouraged f/u with PMD

## 2021-06-19 NOTE — ED PROVIDER NOTE - PATIENT PORTAL LINK FT
You can access the FollowMyHealth Patient Portal offered by [facility] by registering at the following website: [email or web address]. By joining [employer]’s FollowMyHealth portal, you will also be able to view your health information using other applications (apps) compatible with our system.

## 2021-06-19 NOTE — ED PROVIDER NOTE - PHYSICAL EXAMINATION
Gen - WDWN elderly M, NAD, comfortable and non-toxic appearing  Skin - warm, dry, clusters of vesicular rash on erythematous base over L L1-2 dermatomal region with scab formation and mild excoriation, not crossing midline, no dc or bleeding or streaking   HEENT - AT/NC, airway patent, neck supple   CV - S1S2, R/R/R  Resp - CTAB, no r/r/w  GI - soft, ND, NT, no CVAT b/l   MS - w/w/p, no c/c/e  Neuro - AxOx3, no focal neuro deficits, ambulatory without gait disturbance

## 2021-07-13 NOTE — ED PROVIDER NOTE - CHPI ED SYMPTOMS NEG
Prescription approved per [facility] Refill Protocol.  Rupal Jones RN     no chills/no fever

## 2022-09-07 ENCOUNTER — EMERGENCY (EMERGENCY)
Facility: HOSPITAL | Age: 82
LOS: 1 days | Discharge: HOME | End: 2022-09-07
Admitting: EMERGENCY MEDICINE

## 2022-09-07 DIAGNOSIS — Z86.79 PERSONAL HISTORY OF OTHER DISEASES OF THE CIRCULATORY SYSTEM: ICD-10-CM

## 2022-09-07 DIAGNOSIS — I50.9 HEART FAILURE, UNSPECIFIED: ICD-10-CM

## 2022-09-07 DIAGNOSIS — L29.9 PRURITUS, UNSPECIFIED: ICD-10-CM

## 2022-09-07 DIAGNOSIS — L21.9 SEBORRHEIC DERMATITIS, UNSPECIFIED: ICD-10-CM

## 2022-09-07 DIAGNOSIS — N40.0 BENIGN PROSTATIC HYPERPLASIA WITHOUT LOWER URINARY TRACT SYMPTOMS: ICD-10-CM

## 2022-09-07 DIAGNOSIS — Z95.0 PRESENCE OF CARDIAC PACEMAKER: ICD-10-CM

## 2022-09-07 DIAGNOSIS — Z88.1 ALLERGY STATUS TO OTHER ANTIBIOTIC AGENTS STATUS: ICD-10-CM

## 2022-09-07 DIAGNOSIS — Z95.0 PRESENCE OF CARDIAC PACEMAKER: Chronic | ICD-10-CM

## 2022-09-07 PROCEDURE — 99283 EMERGENCY DEPT VISIT LOW MDM: CPT

## 2022-09-08 VITALS
HEIGHT: 73 IN | RESPIRATION RATE: 18 BRPM | SYSTOLIC BLOOD PRESSURE: 176 MMHG | HEART RATE: 66 BPM | TEMPERATURE: 98 F | WEIGHT: 160.06 LBS | DIASTOLIC BLOOD PRESSURE: 84 MMHG | OXYGEN SATURATION: 97 %

## 2022-09-08 RX ORDER — KETOCONAZOLE 20 MG/G
1 AEROSOL, FOAM TOPICAL
Qty: 60 | Refills: 0
Start: 2022-09-08 | End: 2022-10-05

## 2022-09-08 NOTE — ED PROVIDER NOTE - NSICDXPASTMEDICALHX_GEN_ALL_CORE_FT
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

## 2022-09-08 NOTE — ED PROVIDER NOTE - CLINICAL SUMMARY MEDICAL DECISION MAKING FREE TEXT BOX
81 yo M, presenting to the ED w/ scalp itching for the past 2 weeks. Patient has no signs of lice on exam, however he is found to have findings consistent w/ seborrheic dermatitis. Will RX ketoconazole shampoo and recommending moisturizing w/ oil afterwards. If no improvement, derm f/u - will send request to appt coordinator. Pt given return precautions. He is stable on DC.

## 2022-09-08 NOTE — ED ADULT TRIAGE NOTE - CHIEF COMPLAINT QUOTE
BIBA c/o itching scalp x2 weeks. nothing noted on scalp other than dry skin/scabs where the pt has been scratching.

## 2022-09-08 NOTE — ED PROVIDER NOTE - NSFOLLOWUPINSTRUCTIONS_ED_ALL_ED_FT
A PRESCRIPTION FOR KETOCONOZOLE SHAMPOO HAS BEEN SENT TO YOUR PHARMACY.  IF YOU DO NOT WISH TO WAIT AND WOULD LIKE TO  THE OVER-THE-COUNTER' VERSION, THE NAME IS NANCI MARKS.  PLEASE DRY YOUR HAIR FULLY THEN YOU CAN APPLY CASTOR OIL TO SCALP IN ORDER TO HELP MOISTURE AREA.  IF NO IMPROVEMENT, PLEASE FOLLOW UP WITH DERMATOLOGY.   RETURN TO THE ER FOR ANY WORSENED SYMPTOMS.      Seborrheic Dermatitis    WHAT YOU NEED TO KNOW:    Seborrheic dermatitis is a skin condition that causes a rash and flaking, scaling skin. The condition usually affects hairy areas of the body, such as the scalp. Your face, eyebrows, ears, chest, groin, or back may be affected. Seborrheic dermatitis can happen at any age. The condition often goes away on its own in infants, but it may return during adolescence. Seborrheic dermatitis may be caused by a fungal infection, immune system problems, or hormone changes.    DISCHARGE INSTRUCTIONS:    Call your doctor or dermatologist if:   •You have new or worsening symptoms.      •Your symptoms make it difficult for you to do your daily activities.      •Your symptoms do not improve even after treatment.      •You have questions or concerns about your condition or care.      Medicines:   •Medicines may be given to treat a fungal or bacterial infection. You may also need a steroid medicine. You may be given these medicines in pill form or in a cream to apply to your skin.      •Take your medicine as directed. Contact your healthcare provider if you think your medicine is not helping or if you have side effects. Tell your provider if you are allergic to any medicine. Keep a list of the medicines, vitamins, and herbs you take. Include the amounts, and when and why you take them. Bring the list or the pill bottles to follow-up visits. Carry your medicine list with you in case of an emergency.      Manage seborrheic dermatitis:   •Wash your skin and hair often. Your healthcare provider can tell you how often to wash. You may need to wash your hair every day or two, or once per week, depending on the kind of hair you have. Apply a gentle moisturizer to your skin after you wash. Use mild soaps and moisturizers. Do not use any product that contains alcohol. Alcohol can dry your skin and make your symptoms worse.      •Protect your scalp if you use coal tar shampoo. Coal tar shampoo can make your skin more sensitive to light. Wear a hat when you are outside. Do not use tanning beds or sun lamps.      •Remove scales after you soften them. Do not pull on the scales. This can spread infection and may cause hair loss. Apply mineral oil or olive oil to the skin and let it sit for 1 hour. Then use a soft-bristled brush to remove the scales or shampoo your hair.      •Clean your eyelids, if needed. Use baby shampoo to wash your eyelids every night. Use a cotton swab to remove scales. A warm compress may also help control symptoms. To make a warm compress, soak a soft washcloth in warm water. Wring out the extra water and apply the cloth to your eyelid for a few minutes.      •Consider shaving off your beard or mustache. Your symptoms may be worse under your beard or mustache. Shaving may help reduce your symptoms and prevent them from returning in this area.      Follow up with your doctor or dermatologist as directed: Write down your questions so you remember to ask them during your visits.

## 2022-09-08 NOTE — ED PROVIDER NOTE - OBJECTIVE STATEMENT
81 yo M, presenting to the ED w/ scalp itching for the past 2 weeks. Pt reports scratching frequently, he will also note white flakes falling from the area. Pt washes his hair but denies moisturizing the area. Further denies fevers, chills, chest pain, SOB, nausea, vomiting, headaches, or dizziness.

## 2022-09-08 NOTE — ED PROVIDER NOTE - PATIENT PORTAL LINK FT
You can access the FollowMyHealth Patient Portal offered by [facility] by registering at the following website: [email or web address]. By joining [employer]’s FollowMyHealth portal, you will also be able to view your health information using other applications (apps) compatible with our system.

## 2023-08-04 NOTE — ED ADULT TRIAGE NOTE - WEIGHT IN KG
Billing Type: Third-Party Bill Performing Laboratory: 0 72.6 Expected Date Of Service: 08/04/2023 Bill For Surgical Tray: no

## 2023-11-07 NOTE — ED ADULT NURSE NOTE - NSFALLRSKINDICATORS_ED_ALL_ED
Pravastatin sent to pharmacy message sent to [facility] that she is due for blood work for lipid panel, CMP and thyroid no

## 2023-12-02 ENCOUNTER — EMERGENCY (EMERGENCY)
Facility: HOSPITAL | Age: 83
LOS: 1 days | Discharge: HOME | End: 2023-12-02
Attending: EMERGENCY MEDICINE | Admitting: EMERGENCY MEDICINE
Payer: MEDICARE

## 2023-12-02 VITALS
HEART RATE: 72 BPM | WEIGHT: 164.91 LBS | DIASTOLIC BLOOD PRESSURE: 87 MMHG | SYSTOLIC BLOOD PRESSURE: 156 MMHG | TEMPERATURE: 98 F | RESPIRATION RATE: 18 BRPM | OXYGEN SATURATION: 99 %

## 2023-12-02 DIAGNOSIS — G47.00 INSOMNIA, UNSPECIFIED: ICD-10-CM

## 2023-12-02 DIAGNOSIS — Z95.0 PRESENCE OF CARDIAC PACEMAKER: Chronic | ICD-10-CM

## 2023-12-02 DIAGNOSIS — Z88.1 ALLERGY STATUS TO OTHER ANTIBIOTIC AGENTS STATUS: ICD-10-CM

## 2023-12-02 LAB
ALBUMIN SERPL ELPH-MCNC: 3.4 G/DL — SIGNIFICANT CHANGE UP (ref 3.4–5)
ALBUMIN SERPL ELPH-MCNC: 3.4 G/DL — SIGNIFICANT CHANGE UP (ref 3.4–5)
ALP SERPL-CCNC: 83 U/L — SIGNIFICANT CHANGE UP (ref 40–120)
ALP SERPL-CCNC: 83 U/L — SIGNIFICANT CHANGE UP (ref 40–120)
ALT FLD-CCNC: 23 U/L — SIGNIFICANT CHANGE UP (ref 12–42)
ALT FLD-CCNC: 23 U/L — SIGNIFICANT CHANGE UP (ref 12–42)
ANION GAP SERPL CALC-SCNC: 6 MMOL/L — LOW (ref 9–16)
ANION GAP SERPL CALC-SCNC: 6 MMOL/L — LOW (ref 9–16)
AST SERPL-CCNC: 31 U/L — SIGNIFICANT CHANGE UP (ref 15–37)
AST SERPL-CCNC: 31 U/L — SIGNIFICANT CHANGE UP (ref 15–37)
BASOPHILS # BLD AUTO: 0.03 K/UL — SIGNIFICANT CHANGE UP (ref 0–0.2)
BASOPHILS # BLD AUTO: 0.03 K/UL — SIGNIFICANT CHANGE UP (ref 0–0.2)
BASOPHILS NFR BLD AUTO: 0.6 % — SIGNIFICANT CHANGE UP (ref 0–2)
BASOPHILS NFR BLD AUTO: 0.6 % — SIGNIFICANT CHANGE UP (ref 0–2)
BILIRUB SERPL-MCNC: 0.4 MG/DL — SIGNIFICANT CHANGE UP (ref 0.2–1.2)
BILIRUB SERPL-MCNC: 0.4 MG/DL — SIGNIFICANT CHANGE UP (ref 0.2–1.2)
BUN SERPL-MCNC: 38 MG/DL — HIGH (ref 7–23)
BUN SERPL-MCNC: 38 MG/DL — HIGH (ref 7–23)
CALCIUM SERPL-MCNC: 9.5 MG/DL — SIGNIFICANT CHANGE UP (ref 8.5–10.5)
CALCIUM SERPL-MCNC: 9.5 MG/DL — SIGNIFICANT CHANGE UP (ref 8.5–10.5)
CHLORIDE SERPL-SCNC: 105 MMOL/L — SIGNIFICANT CHANGE UP (ref 96–108)
CHLORIDE SERPL-SCNC: 105 MMOL/L — SIGNIFICANT CHANGE UP (ref 96–108)
CO2 SERPL-SCNC: 28 MMOL/L — SIGNIFICANT CHANGE UP (ref 22–31)
CO2 SERPL-SCNC: 28 MMOL/L — SIGNIFICANT CHANGE UP (ref 22–31)
CREAT SERPL-MCNC: 0.89 MG/DL — SIGNIFICANT CHANGE UP (ref 0.5–1.3)
CREAT SERPL-MCNC: 0.89 MG/DL — SIGNIFICANT CHANGE UP (ref 0.5–1.3)
EGFR: 85 ML/MIN/1.73M2 — SIGNIFICANT CHANGE UP
EGFR: 85 ML/MIN/1.73M2 — SIGNIFICANT CHANGE UP
EOSINOPHIL # BLD AUTO: 0.09 K/UL — SIGNIFICANT CHANGE UP (ref 0–0.5)
EOSINOPHIL # BLD AUTO: 0.09 K/UL — SIGNIFICANT CHANGE UP (ref 0–0.5)
EOSINOPHIL NFR BLD AUTO: 1.7 % — SIGNIFICANT CHANGE UP (ref 0–6)
EOSINOPHIL NFR BLD AUTO: 1.7 % — SIGNIFICANT CHANGE UP (ref 0–6)
GLUCOSE SERPL-MCNC: 112 MG/DL — HIGH (ref 70–99)
GLUCOSE SERPL-MCNC: 112 MG/DL — HIGH (ref 70–99)
HCT VFR BLD CALC: 36.3 % — LOW (ref 39–50)
HCT VFR BLD CALC: 36.3 % — LOW (ref 39–50)
HGB BLD-MCNC: 11.8 G/DL — LOW (ref 13–17)
HGB BLD-MCNC: 11.8 G/DL — LOW (ref 13–17)
IMM GRANULOCYTES NFR BLD AUTO: 0 % — SIGNIFICANT CHANGE UP (ref 0–0.9)
IMM GRANULOCYTES NFR BLD AUTO: 0 % — SIGNIFICANT CHANGE UP (ref 0–0.9)
LYMPHOCYTES # BLD AUTO: 0.6 K/UL — LOW (ref 1–3.3)
LYMPHOCYTES # BLD AUTO: 0.6 K/UL — LOW (ref 1–3.3)
LYMPHOCYTES # BLD AUTO: 11.4 % — LOW (ref 13–44)
LYMPHOCYTES # BLD AUTO: 11.4 % — LOW (ref 13–44)
MCHC RBC-ENTMCNC: 30.2 PG — SIGNIFICANT CHANGE UP (ref 27–34)
MCHC RBC-ENTMCNC: 30.2 PG — SIGNIFICANT CHANGE UP (ref 27–34)
MCHC RBC-ENTMCNC: 32.5 GM/DL — SIGNIFICANT CHANGE UP (ref 32–36)
MCHC RBC-ENTMCNC: 32.5 GM/DL — SIGNIFICANT CHANGE UP (ref 32–36)
MCV RBC AUTO: 92.8 FL — SIGNIFICANT CHANGE UP (ref 80–100)
MCV RBC AUTO: 92.8 FL — SIGNIFICANT CHANGE UP (ref 80–100)
MONOCYTES # BLD AUTO: 0.45 K/UL — SIGNIFICANT CHANGE UP (ref 0–0.9)
MONOCYTES # BLD AUTO: 0.45 K/UL — SIGNIFICANT CHANGE UP (ref 0–0.9)
MONOCYTES NFR BLD AUTO: 8.5 % — SIGNIFICANT CHANGE UP (ref 2–14)
MONOCYTES NFR BLD AUTO: 8.5 % — SIGNIFICANT CHANGE UP (ref 2–14)
NEUTROPHILS # BLD AUTO: 4.1 K/UL — SIGNIFICANT CHANGE UP (ref 1.8–7.4)
NEUTROPHILS # BLD AUTO: 4.1 K/UL — SIGNIFICANT CHANGE UP (ref 1.8–7.4)
NEUTROPHILS NFR BLD AUTO: 77.8 % — HIGH (ref 43–77)
NEUTROPHILS NFR BLD AUTO: 77.8 % — HIGH (ref 43–77)
NRBC # BLD: 0 /100 WBCS — SIGNIFICANT CHANGE UP (ref 0–0)
NRBC # BLD: 0 /100 WBCS — SIGNIFICANT CHANGE UP (ref 0–0)
PLATELET # BLD AUTO: 225 K/UL — SIGNIFICANT CHANGE UP (ref 150–400)
PLATELET # BLD AUTO: 225 K/UL — SIGNIFICANT CHANGE UP (ref 150–400)
POTASSIUM SERPL-MCNC: 3.9 MMOL/L — SIGNIFICANT CHANGE UP (ref 3.5–5.3)
POTASSIUM SERPL-MCNC: 3.9 MMOL/L — SIGNIFICANT CHANGE UP (ref 3.5–5.3)
POTASSIUM SERPL-SCNC: 3.9 MMOL/L — SIGNIFICANT CHANGE UP (ref 3.5–5.3)
POTASSIUM SERPL-SCNC: 3.9 MMOL/L — SIGNIFICANT CHANGE UP (ref 3.5–5.3)
PROT SERPL-MCNC: 7.3 G/DL — SIGNIFICANT CHANGE UP (ref 6.4–8.2)
PROT SERPL-MCNC: 7.3 G/DL — SIGNIFICANT CHANGE UP (ref 6.4–8.2)
RBC # BLD: 3.91 M/UL — LOW (ref 4.2–5.8)
RBC # BLD: 3.91 M/UL — LOW (ref 4.2–5.8)
RBC # FLD: 15.1 % — HIGH (ref 10.3–14.5)
RBC # FLD: 15.1 % — HIGH (ref 10.3–14.5)
SODIUM SERPL-SCNC: 139 MMOL/L — SIGNIFICANT CHANGE UP (ref 132–145)
SODIUM SERPL-SCNC: 139 MMOL/L — SIGNIFICANT CHANGE UP (ref 132–145)
WBC # BLD: 5.27 K/UL — SIGNIFICANT CHANGE UP (ref 3.8–10.5)
WBC # BLD: 5.27 K/UL — SIGNIFICANT CHANGE UP (ref 3.8–10.5)
WBC # FLD AUTO: 5.27 K/UL — SIGNIFICANT CHANGE UP (ref 3.8–10.5)
WBC # FLD AUTO: 5.27 K/UL — SIGNIFICANT CHANGE UP (ref 3.8–10.5)

## 2023-12-02 PROCEDURE — 99285 EMERGENCY DEPT VISIT HI MDM: CPT

## 2023-12-02 NOTE — ED PROVIDER NOTE - PATIENT PORTAL LINK FT
You can access the FollowMyHealth Patient Portal offered by [facility] by registering at the following website: [email or web address]. By joining [employer]’s FollowMyHealth portal, you will also be able to view your health information using other applications (apps) compatible with our system.

## 2023-12-02 NOTE — ED ADULT NURSE NOTE - NSFALLRISKFACTORS_ED_ALL_ED
The skin of the bilateral groins was clipped, prepped and draped in the usual sterile manner. (If not otherwise specified, skin prep was bilateral.)  Other

## 2023-12-02 NOTE — ED PROVIDER NOTE - NS ED MD DISPO DISCHARGE
"Called patient to triage/gather more information.  Patient states he \"feels much better\".  Patient denies any symptoms at this time.  Patient's diarrhea has resolved and he is drinking \"plenty of gatorade and water.  Patient does monitor BP at home, today was 115/70.  Writer offered to make patient appointment but patient declined at this time.  Patient will contact clinic if symptoms return.    Patient stated he was very disappointed in the healthcare system after waiting five hours in the Emergency Department only to be sent home with instructions to drink more fluids.  "
Please triage  Probably would recommend visit--virtual would be fine with someone in the coming couple of days    Thanks    Pilo Tanner MD on 7/12/2023 at 2:55 PM   
Home

## 2023-12-02 NOTE — ED PROVIDER NOTE - PROGRESS NOTE DETAILS
Spoke with Dr. Garcia, covering for Dr. Forde. She said patient has presented to the ED multiple times for same complaints. Dr. Forde has been trying to address the psychosocial issues as an outpatient. She will inform Dr. Forde. I offered patient blood and urine tests several times. Patient refused repeatedly, until at time of discharge, when he requested tests. Patient resting comfortably, feels moderately improved. Has chronic blanco catheter but no back or abdominal sx. Urine in the bag is clear yellow. No need for UA or urine culture. Ate a sandwich and feels less depressed. Will f/u with his PMD next week. Return to the ED immediately if getting worse, not improving, or if having any new or troubling symptoms.

## 2023-12-02 NOTE — ED PROVIDER NOTE - PHYSICAL EXAMINATION
Gen: Well-developed, well-nourished, NAD, VS as noted by nursing. HEENT: NCAT, mmm   Chest: RRR, nl S1 and S2, no m/r/g. Resp: CTAB, no w/r/r  Abd: nl BS, soft, nt/nd. M/S: scoliosis Ext: Warm, dry  Neuro: CN II-XII intact, normal and equal strength, sensation, and reflexes bilaterally, normal gait  Psych: AAOx3

## 2023-12-02 NOTE — ED PROVIDER NOTE - OBJECTIVE STATEMENT
Patient reports he did not sleep last night. Forgot to take his mirtazepine, which is prescribed for sleep. No fever, ha, cp, sob, ap, n/v/d, focal weakness, paresthesias, dysuria, urgency, frequency. States he does not know why he can't sleep, but this has been a repeated problem for him.

## 2023-12-02 NOTE — ED PROVIDER NOTE - NSFOLLOWUPINSTRUCTIONS_ED_ALL_ED_FT
***Take your mirtazepine as prescribed.***    Insomnia  Insomnia is a sleep disorder that makes it difficult to fall asleep or stay asleep. Insomnia can cause fatigue, low energy, difficulty concentrating, mood swings, and poor performance at work or school.    There are three different ways to classify insomnia:  Difficulty falling asleep.  Difficulty staying asleep.  Waking up too early in the morning.  Any type of insomnia can be long-term (chronic) or short-term (acute). Both are common. Short-term insomnia usually lasts for 3 months or less. Chronic insomnia occurs at least three times a week for longer than 3 months.    What are the causes?  Insomnia may be caused by another condition, situation, or substance, such as:  Having certain mental health conditions, such as anxiety and depression.  Using caffeine, alcohol, tobacco, or drugs.  Having gastrointestinal conditions, such as gastroesophageal reflux disease (GERD).  Having certain medical conditions. These include:  Asthma.  Alzheimer's disease.  Stroke.  Chronic pain.  An overactive thyroid gland (hyperthyroidism).  Other sleep disorders, such as restless legs syndrome and sleep apnea.  Menopause.  Sometimes, the cause of insomnia may not be known.    What increases the risk?  Risk factors for insomnia include:  Gender. Females are affected more often than males.  Age. Insomnia is more common as people get older.  Stress and certain medical and mental health conditions.  Lack of exercise.  Having an irregular work schedule. This may include working night shifts and traveling between different time zones.  What are the signs or symptoms?  If you have insomnia, the main symptom is having trouble falling asleep or having trouble staying asleep. This may lead to other symptoms, such as:  Feeling tired or having low energy.  Feeling nervous about going to sleep.  Not feeling rested in the morning.  Having trouble concentrating.  Feeling irritable, anxious, or depressed.  How is this diagnosed?  This condition may be diagnosed based on:  Your symptoms and medical history. Your health care provider may ask about:  Your sleep habits.  Any medical conditions you have.  Your mental health.  A physical exam.  How is this treated?  Treatment for insomnia depends on the cause. Treatment may focus on treating an underlying condition that is causing the insomnia. Treatment may also include:  Medicines to help you sleep.  Counseling or therapy.  Lifestyle adjustments to help you sleep better.  Follow these instructions at home:  Eating and drinking    A sign showing that a person should not drink alcohol.  Limit or avoid alcohol, caffeinated beverages, and products that contain nicotine and tobacco, especially close to bedtime. These can disrupt your sleep.  Do not eat a large meal or eat spicy foods right before bedtime. This can lead to digestive discomfort that can make it hard for you to sleep.  Sleep habits    A person writing in a diary.  Keep a sleep diary to help you and your health care provider figure out what could be causing your insomnia. Write down:  When you sleep.  When you wake up during the night.  How well you sleep and how rested you feel the next day.  Any side effects of medicines you are taking.  What you eat and drink.  Make your bedroom a dark, comfortable place where it is easy to fall asleep.  Put up shades or blackout curtains to block light from outside.  Use a white noise machine to block noise.  Keep the temperature cool.  Limit screen use before bedtime. This includes:  Not watching TV.  Not using your smartphone, tablet, or computer.  Stick to a routine that includes going to bed and waking up at the same times every day and night. This can help you fall asleep faster. Consider making a quiet activity, such as reading, part of your nighttime routine.  Try to avoid taking naps during the day so that you sleep better at night.  Get out of bed if you are still awake after 15 minutes of trying to sleep. Keep the lights down, but try reading or doing a quiet activity. When you feel sleepy, go back to bed.  General instructions    Take over-the-counter and prescription medicines only as told by your health care provider.  Exercise regularly as told by your health care provider. However, avoid exercising in the hours right before bedtime.  Use relaxation techniques to manage stress. Ask your health care provider to suggest some techniques that may work well for you. These may include:  Breathing exercises.  Routines to release muscle tension.  Visualizing peaceful scenes.  Make sure that you drive carefully. Do not drive if you feel very sleepy.  Keep all follow-up visits. This is important.  Contact a health care provider if:  You are tired throughout the day.  You have trouble in your daily routine due to sleepiness.  You continue to have sleep problems, or your sleep problems get worse.  Get help right away if:  You have thoughts about hurting yourself or someone else.  Get help right away if you feel like you may hurt yourself or others, or have thoughts about taking your own life. Go to your nearest emergency room or:  Call 911.  Call the National Suicide Prevention Lifeline at 1-150.920.8218 or 176. This is open 24 hours a day.  Text the Crisis Text Line at 970814.  Summary  Insomnia is a sleep disorder that makes it difficult to fall asleep or stay asleep.  Insomnia can be long-term (chronic) or short-term (acute).  Treatment for insomnia depends on the cause. Treatment may focus on treating an underlying condition that is causing the insomnia.  Keep a sleep diary to help you and your health care provider figure out what could be causing your insomnia.  This information is not intended to replace advice given to you by your health care provider. Make sure you discuss any questions you have with your health care provider. ***Take your mirtazepine as prescribed.***    [facility]  [address]  [phone]    Insomnia  Insomnia is a sleep disorder that makes it difficult to fall asleep or stay asleep. Insomnia can cause fatigue, low energy, difficulty concentrating, mood swings, and poor performance at work or school.    There are three different ways to classify insomnia:  Difficulty falling asleep.  Difficulty staying asleep.  Waking up too early in the morning.  Any type of insomnia can be long-term (chronic) or short-term (acute). Both are common. Short-term insomnia usually lasts for 3 months or less. Chronic insomnia occurs at least three times a week for longer than 3 months.    What are the causes?  Insomnia may be caused by another condition, situation, or substance, such as:  Having certain mental health conditions, such as anxiety and depression.  Using caffeine, alcohol, tobacco, or drugs.  Having gastrointestinal conditions, such as gastroesophageal reflux disease (GERD).  Having certain medical conditions. These include:  Asthma.  Alzheimer's disease.  Stroke.  Chronic pain.  An overactive thyroid gland (hyperthyroidism).  Other sleep disorders, such as restless legs syndrome and sleep apnea.  Menopause.  Sometimes, the cause of insomnia may not be known.    What increases the risk?  Risk factors for insomnia include:  Gender. Females are affected more often than males.  Age. Insomnia is more common as people get older.  Stress and certain medical and mental health conditions.  Lack of exercise.  Having an irregular work schedule. This may include working night shifts and traveling between different time zones.  What are the signs or symptoms?  If you have insomnia, the main symptom is having trouble falling asleep or having trouble staying asleep. This may lead to other symptoms, such as:  Feeling tired or having low energy.  Feeling nervous about going to sleep.  Not feeling rested in the morning.  Having trouble concentrating.  Feeling irritable, anxious, or depressed.  How is this diagnosed?  This condition may be diagnosed based on:  Your symptoms and medical history. Your health care provider may ask about:  Your sleep habits.  Any medical conditions you have.  Your mental health.  A physical exam.  How is this treated?  Treatment for insomnia depends on the cause. Treatment may focus on treating an underlying condition that is causing the insomnia. Treatment may also include:  Medicines to help you sleep.  Counseling or therapy.  Lifestyle adjustments to help you sleep better.  Follow these instructions at home:  Eating and drinking    A sign showing that a person should not drink alcohol.  Limit or avoid alcohol, caffeinated beverages, and products that contain nicotine and tobacco, especially close to bedtime. These can disrupt your sleep.  Do not eat a large meal or eat spicy foods right before bedtime. This can lead to digestive discomfort that can make it hard for you to sleep.  Sleep habits    A person writing in a diary.  Keep a sleep diary to help you and your health care provider figure out what could be causing your insomnia. Write down:  When you sleep.  When you wake up during the night.  How well you sleep and how rested you feel the next day.  Any side effects of medicines you are taking.  What you eat and drink.  Make your bedroom a dark, comfortable place where it is easy to fall asleep.  Put up shades or blackout curtains to block light from outside.  Use a white noise machine to block noise.  Keep the temperature cool.  Limit screen use before bedtime. This includes:  Not watching TV.  Not using your smartphone, tablet, or computer.  Stick to a routine that includes going to bed and waking up at the same times every day and night. This can help you fall asleep faster. Consider making a quiet activity, such as reading, part of your nighttime routine.  Try to avoid taking naps during the day so that you sleep better at night.  Get out of bed if you are still awake after 15 minutes of trying to sleep. Keep the lights down, but try reading or doing a quiet activity. When you feel sleepy, go back to bed.  General instructions    Take over-the-counter and prescription medicines only as told by your health care provider.  Exercise regularly as told by your health care provider. However, avoid exercising in the hours right before bedtime.  Use relaxation techniques to manage stress. Ask your health care provider to suggest some techniques that may work well for you. These may include:  Breathing exercises.  Routines to release muscle tension.  Visualizing peaceful scenes.  Make sure that you drive carefully. Do not drive if you feel very sleepy.  Keep all follow-up visits. This is important.  Contact a health care provider if:  You are tired throughout the day.  You have trouble in your daily routine due to sleepiness.  You continue to have sleep problems, or your sleep problems get worse.  Get help right away if:  You have thoughts about hurting yourself or someone else.  Get help right away if you feel like you may hurt yourself or others, or have thoughts about taking your own life. Go to your nearest emergency room or:  Call 911.  Call the National Suicide Prevention Lifeline at 1-810.504.6688 or 958. This is open 24 hours a day.  Text the Crisis Text Line at 235584.  Summary  Insomnia is a sleep disorder that makes it difficult to fall asleep or stay asleep.  Insomnia can be long-term (chronic) or short-term (acute).  Treatment for insomnia depends on the cause. Treatment may focus on treating an underlying condition that is causing the insomnia.  Keep a sleep diary to help you and your health care provider figure out what could be causing your insomnia.  This information is not intended to replace advice given to you by your health care provider. Make sure you discuss any questions you have with your health care provider.

## 2023-12-02 NOTE — ED ADULT NURSE REASSESSMENT NOTE - NS ED NURSE REASSESS COMMENT FT1
3rd time to have patient redirected into bed. Pt is a+Ox4 refuses to get back into bed, would rather stand. Pt educated on risks.

## 2023-12-02 NOTE — ED ADULT NURSE NOTE - NSFALLUNIVINTERV_ED_ALL_ED
Bed/Stretcher in lowest position, wheels locked, appropriate side rails in place/Call bell, personal items and telephone in reach/Instruct patient to call for assistance before getting out of bed/chair/stretcher/Non-slip footwear applied when patient is off stretcher/[place] to call system/Physically safe environment - no spills, clutter or unnecessary equipment/Purposeful proactive rounding/Room/bathroom lighting operational, light cord in reach

## 2023-12-02 NOTE — ED PROVIDER NOTE - NSICDXPASTMEDICALHX_GEN_ALL_CORE_FT
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

## 2024-03-16 ENCOUNTER — EMERGENCY (EMERGENCY)
Facility: HOSPITAL | Age: 84
LOS: 1 days | Discharge: HOME | End: 2024-03-16
Attending: EMERGENCY MEDICINE | Admitting: EMERGENCY MEDICINE
Payer: MEDICARE

## 2024-03-16 VITALS
SYSTOLIC BLOOD PRESSURE: 151 MMHG | DIASTOLIC BLOOD PRESSURE: 84 MMHG | OXYGEN SATURATION: 98 % | TEMPERATURE: 97 F | RESPIRATION RATE: 16 BRPM | HEART RATE: 81 BPM

## 2024-03-16 VITALS
SYSTOLIC BLOOD PRESSURE: 148 MMHG | OXYGEN SATURATION: 97 % | TEMPERATURE: 98 F | DIASTOLIC BLOOD PRESSURE: 72 MMHG | HEART RATE: 82 BPM | RESPIRATION RATE: 16 BRPM

## 2024-03-16 DIAGNOSIS — Z95.0 PRESENCE OF CARDIAC PACEMAKER: Chronic | ICD-10-CM

## 2024-03-16 PROBLEM — G47.00 INSOMNIA, UNSPECIFIED: Chronic | Status: ACTIVE | Noted: 2023-12-02

## 2024-03-16 LAB
ALBUMIN SERPL ELPH-MCNC: 3 G/DL — LOW (ref 3.4–5)
ALP SERPL-CCNC: 93 U/L — SIGNIFICANT CHANGE UP (ref 40–120)
ALT FLD-CCNC: 15 U/L — SIGNIFICANT CHANGE UP (ref 12–42)
ANION GAP SERPL CALC-SCNC: 8 MMOL/L — LOW (ref 9–16)
AST SERPL-CCNC: 20 U/L — SIGNIFICANT CHANGE UP (ref 15–37)
BASOPHILS # BLD AUTO: 0.02 K/UL — SIGNIFICANT CHANGE UP (ref 0–0.2)
BASOPHILS NFR BLD AUTO: 0.5 % — SIGNIFICANT CHANGE UP (ref 0–2)
BILIRUB SERPL-MCNC: 0.3 MG/DL — SIGNIFICANT CHANGE UP (ref 0.2–1.2)
BUN SERPL-MCNC: 38 MG/DL — HIGH (ref 7–23)
CALCIUM SERPL-MCNC: 9.3 MG/DL — SIGNIFICANT CHANGE UP (ref 8.5–10.5)
CHLORIDE SERPL-SCNC: 108 MMOL/L — SIGNIFICANT CHANGE UP (ref 96–108)
CO2 SERPL-SCNC: 28 MMOL/L — SIGNIFICANT CHANGE UP (ref 22–31)
CREAT SERPL-MCNC: 0.98 MG/DL — SIGNIFICANT CHANGE UP (ref 0.5–1.3)
EGFR: 77 ML/MIN/1.73M2 — SIGNIFICANT CHANGE UP
EOSINOPHIL # BLD AUTO: 0.04 K/UL — SIGNIFICANT CHANGE UP (ref 0–0.5)
EOSINOPHIL NFR BLD AUTO: 0.9 % — SIGNIFICANT CHANGE UP (ref 0–6)
GLUCOSE SERPL-MCNC: 100 MG/DL — HIGH (ref 70–99)
HCT VFR BLD CALC: 36.4 % — LOW (ref 39–50)
HGB BLD-MCNC: 11.8 G/DL — LOW (ref 13–17)
IMM GRANULOCYTES NFR BLD AUTO: 0.2 % — SIGNIFICANT CHANGE UP (ref 0–0.9)
LYMPHOCYTES # BLD AUTO: 0.57 K/UL — LOW (ref 1–3.3)
LYMPHOCYTES # BLD AUTO: 13 % — SIGNIFICANT CHANGE UP (ref 13–44)
MCHC RBC-ENTMCNC: 29.8 PG — SIGNIFICANT CHANGE UP (ref 27–34)
MCHC RBC-ENTMCNC: 32.4 GM/DL — SIGNIFICANT CHANGE UP (ref 32–36)
MCV RBC AUTO: 91.9 FL — SIGNIFICANT CHANGE UP (ref 80–100)
MONOCYTES # BLD AUTO: 0.4 K/UL — SIGNIFICANT CHANGE UP (ref 0–0.9)
MONOCYTES NFR BLD AUTO: 9.2 % — SIGNIFICANT CHANGE UP (ref 2–14)
NEUTROPHILS # BLD AUTO: 3.33 K/UL — SIGNIFICANT CHANGE UP (ref 1.8–7.4)
NEUTROPHILS NFR BLD AUTO: 76.2 % — SIGNIFICANT CHANGE UP (ref 43–77)
NRBC # BLD: 0 /100 WBCS — SIGNIFICANT CHANGE UP (ref 0–0)
NT-PROBNP SERPL-SCNC: 1072 PG/ML — HIGH
PLATELET # BLD AUTO: 200 K/UL — SIGNIFICANT CHANGE UP (ref 150–400)
POTASSIUM SERPL-MCNC: 4.4 MMOL/L — SIGNIFICANT CHANGE UP (ref 3.5–5.3)
POTASSIUM SERPL-SCNC: 4.4 MMOL/L — SIGNIFICANT CHANGE UP (ref 3.5–5.3)
PROT SERPL-MCNC: 6.9 G/DL — SIGNIFICANT CHANGE UP (ref 6.4–8.2)
RBC # BLD: 3.96 M/UL — LOW (ref 4.2–5.8)
RBC # FLD: 15 % — HIGH (ref 10.3–14.5)
SODIUM SERPL-SCNC: 144 MMOL/L — SIGNIFICANT CHANGE UP (ref 132–145)
TROPONIN I, HIGH SENSITIVITY RESULT: 22.6 NG/L — SIGNIFICANT CHANGE UP
WBC # BLD: 4.37 K/UL — SIGNIFICANT CHANGE UP (ref 3.8–10.5)
WBC # FLD AUTO: 4.37 K/UL — SIGNIFICANT CHANGE UP (ref 3.8–10.5)

## 2024-03-16 PROCEDURE — 71046 X-RAY EXAM CHEST 2 VIEWS: CPT | Mod: 26

## 2024-03-16 PROCEDURE — 99285 EMERGENCY DEPT VISIT HI MDM: CPT

## 2024-03-16 PROCEDURE — 99053 MED SERV 10PM-8AM 24 HR FAC: CPT

## 2024-03-16 NOTE — ED PROVIDER NOTE - PATIENT PORTAL LINK FT
You can access the FollowMyHealth Patient Portal offered by [facility] by registering at the following website: [email or web address]. By joining [employer]’s FollowMyHealth portal, you will also be able to view your health information using other applications (apps) compatible with our system.

## 2024-03-16 NOTE — ED PROVIDER NOTE - CLINICAL SUMMARY MEDICAL DECISION MAKING FREE TEXT BOX
84YO M hx CHF, PPM, p/w fatigue. vss, pe nml. ddx fatigue s/t insomnia/poor sleep. given cardiac hx, will obtain basic labs, trop, bnp, cxr to r/o CHF exacerbation, acs, likely dc.

## 2024-03-16 NOTE — ED PROVIDER NOTE - OBJECTIVE STATEMENT
82YO M hx CHF, PPM, p/w fatigue. onset today, a/w decreased sleep 1d prior and increased physical activity today. Denies fevers, cp, sob, n/v, abd pain, b/l LE edema, syncope.

## 2024-03-16 NOTE — ED ADULT NURSE NOTE - OBJECTIVE STATEMENT
84y/o BIBA c/o fatigue. pt states he called 911 because he felt very tired. pt states he did not fall. AAOX4.

## 2024-03-16 NOTE — ED ADULT TRIAGE NOTE - CHIEF COMPLAINT QUOTE
Pt BIBA states "I got really tired because I didn't sleep well last night and then I walked around all day". Pt aox4, speech clear, denies dizziness, HA or any other complaints at this time. Pt ambulatory w/ steady gait.

## 2024-03-16 NOTE — ED ADULT NURSE NOTE - NSFALLRISKINTERV_ED_ALL_ED

## 2024-03-16 NOTE — ED ADULT NURSE NOTE - BEFAST ARM SIDE DRIFT
PAST MEDICAL HISTORY:  CAD (coronary artery disease)     CHF (congestive heart failure)     COPD (chronic obstructive pulmonary disease)     H/O back injury     HLD (hyperlipidemia)     HTN (hypertension)     Stenosis of lumbosacral spine No

## 2024-03-19 DIAGNOSIS — Z87.891 PERSONAL HISTORY OF NICOTINE DEPENDENCE: ICD-10-CM

## 2024-03-19 DIAGNOSIS — I50.9 HEART FAILURE, UNSPECIFIED: ICD-10-CM

## 2024-03-19 DIAGNOSIS — Z95.0 PRESENCE OF CARDIAC PACEMAKER: ICD-10-CM

## 2024-03-19 DIAGNOSIS — R53.1 WEAKNESS: ICD-10-CM

## 2024-03-19 DIAGNOSIS — Z88.1 ALLERGY STATUS TO OTHER ANTIBIOTIC AGENTS STATUS: ICD-10-CM

## 2024-03-27 NOTE — ED ADULT TRIAGE NOTE - HEIGHT IN CM
From: Reina Marin  To: Mary Florentino  Sent: 3/27/2024 3:28 PM EDT  Subject: Note for a chair    Андрей Hernandez, I have decided to work at [employer] this year part time. I am a little worried about my stamina. They said I could request a note from my doctor and they will provide a chair for me to sit occasionally. I will be working in the [employer]. Would you be willing to write a note for me?   Thank You  Maira  
Please advise.  
[phone]

## 2024-09-26 ENCOUNTER — EMERGENCY (EMERGENCY)
Age: 84
LOS: 1 days | Discharge: HOME | End: 2024-09-26
Admitting: EMERGENCY MEDICINE
Payer: MEDICARE

## 2024-09-26 VITALS
OXYGEN SATURATION: 98 % | DIASTOLIC BLOOD PRESSURE: 78 MMHG | RESPIRATION RATE: 16 BRPM | SYSTOLIC BLOOD PRESSURE: 143 MMHG | HEART RATE: 78 BPM | TEMPERATURE: 98 F

## 2024-09-26 DIAGNOSIS — Z88.1 ALLERGY STATUS TO OTHER ANTIBIOTIC AGENTS: ICD-10-CM

## 2024-09-26 DIAGNOSIS — Z95.0 PRESENCE OF CARDIAC PACEMAKER: Chronic | ICD-10-CM

## 2024-09-26 DIAGNOSIS — F17.200 NICOTINE DEPENDENCE, UNSPECIFIED, UNCOMPLICATED: ICD-10-CM

## 2024-09-26 DIAGNOSIS — T83.038A LEAKAGE OF OTHER URINARY CATHETER, INITIAL ENCOUNTER: ICD-10-CM

## 2024-09-26 PROCEDURE — 99283 EMERGENCY DEPT VISIT LOW MDM: CPT

## 2025-01-06 NOTE — ED ADULT NURSE NOTE - NSHOSCREENINGQ1_ED_ALL_ED
Chronic Disease Management Services   Office Progress Note    Madhav Slade is a 37 year old Black/ male presenting for an initial visit for Chronic Disease management of DM, HTN, HLD, Anticoagulation, and Vitamin D deficiency. Patient’s primary care provider is Sabrina Martinez MD, last seen today 01/06/2025    Referring Provider: Sabrina Martinez MD  Supervising Provider: Sabrina Martinez MD  Order Expiration Date: 04/04/2025    Pertinent PMH:  Patient currently on HD three times per week (T/Th/Sat). Patient is also receiving care at [facility] for potential kidney transplantation.   Diagnosed with type 2 diabetes at age 22. Significant family history of T2D.     Visit History:  01/06/25: DECREASE Lantus to 4 units daily  12/02/24: initial visit - no changes    ER/Hospitalization History:   Has not been hospitalized since last visit;     HPI:  General Symptoms:   Palpitations denies   HA denies   SOB denies   Fatigue denies  Dizziness denies   CP denies    DM Symptoms:   Polyuria denies  Polydipsia denies   Polyphagia denies   Blurry vision denies   Numbness/tingling denies      Medication Adherence:   Medications reconciled per patient's memory  Taken medications today? No will take with meal  Missed doses of medications: No  Patient reports blood sugars tend to trend lower during dialysis  Note: patient states his blood sugars trend higher at night time  Patient uses a pillbox? No  Medication intolerances:  metformin ER (GI upset)  Medication allergies: NKDA    Diet/Exercise:  Diet  Eating a balanced diet: No  Breakfast: bread, eggs, meat  Lunch: salad  Dinner: chicken, vegetables  Snacks: does not snack very often  Coffee/tea: green tea in the morning  Soda: \"not really\"  Sodium restriction: No  Fluid restriction: No  Exercise: \"pretty active\", works with children  Nicotine Use: Denies use    Routine Health Maintenance:  Aspirin 81 mg daily for cardioprotection: not taking, recently switched to Eliquis  by nephrologist  Statin: taking pravastatin 20 mg daily    ACEI/ARB: taking valsartan 160 mg daily  Vaccinations:  Influenza: Last vaccination date: pt reports 10/2024; up to date,   Pneumovax (PPSV23): Last vaccination date: 11/11/2020 ;up to date Due at age 65  COVID-19 vaccination status: 10/24/2022; not up to date, due now    Immunization History   Administered Date(s) Administered    COVID Pfizer 12Y+ 05/13/2022    COVID Pfizer 12Y+ (Requires Dilution) 02/13/2021, 03/06/2021, 11/08/2021, 05/13/2022    COVID Pfizer Bivalent 12Y+ 10/24/2022    COVID Pfizer/Comirnaty 12+ 10/11/2024    Hep B, adolescent or pediatric 01/14/2000    Influenza, MDCK, quadrivalent, PF 10/24/2022    Influenza, MDCK, trivalent, PF 10/24/2024    Influenza, recombinant, quadrivalent, egg free, PF 10/19/2023    Influenza, split virus, quadrivalent, PF 10/22/2019, 09/24/2020, 11/08/2021    PPD 06/23/2011    Pneumococcal Polysaccharide PPV23 11/11/2020    Pneumococcal conjugate PCV 13 10/22/2019    Tdap 01/03/2019       Vitals:There were no vitals taken for this visit.  BP Readings from Last 3 Encounters:   01/06/25 (!) 90/59   12/05/24 (!) 140/91   12/02/24 118/81     Pulse Readings from Last 3 Encounters:   01/06/25 (!) 18   12/05/24 98   12/02/24 91     Wt Readings from Last 3 Encounters:   01/06/25 54.1 kg   12/02/24 56.9 kg   05/02/24 59.8 kg       Current Outpatient Medications   Medication Instructions    Alcohol Swabs (Alcohol Prep) 70 % Pads Use to clean skin before injecting insulin and checking blood sugar    amLODIPine (NORVASC) 10 MG tablet TAKE 1 TABLET BY MOUTH 1 TIME EACH DAY IN THE EVENING.    apixaBAN (ELIQUIS) 2.5 mg, Oral    B complex-vitamin C-folic acid 0.8 mg, Oral, DAILY    Continuous Glucose Sensor (FreeStyle Fanta 3 Sensor) Misc 1 each, Does not apply, EVERY 14 DAYS, Place on the back of the arm.  Replace every 14 days and rotate sites.    famotidine (PEPCID) 20 mg, Oral, 2 TIMES DAILY    folic acid (FOLATE) 1 mg,  Oral, DAILY    Insulin Lispro, 1 Unit Dial, (HumaLOG KwikPen) 100 UNIT/ML pen-injector Inject 3 units twice daily. Skip insulin before dialysis. Prime 2 units before each dose.    Insulin Pen Needle 32G X 4 MM Misc Use to inject insulin daily. Remove needle cover(s) to expose needle before injecting.    Lantus SoloStar 8 Units, Subcutaneous, NIGHTLY, Prime 2 units before each dose.    loperamide (IMODIUM A-D) 2 mg, Oral, PRN    pravastatin (PRAVACHOL) 20 mg, Oral, NIGHTLY    sevelamer carbonate (RENVELA) 800 mg, Oral, 3 TIMES DAILY WITH MEALS    valsartan (DIOVAN) 160 mg, Oral, DAILY    Vitamin D (Ergocalciferol) 1.25 mg, Oral, 1 DAY A WEEK       Assessment/Plan:  Type 2 Diabetes:  Goals: A1c  <7.5-8  (dialysis), FPG  mg/dL due to age and comorbidities, 2hPPG <200 mg/dL, C-peptide 1.2 (WNL) 9/28/2020  Lab Results   Component Value Date    HGBA1C 5.9 (H) 12/28/2024    HGBA1C 7.6 (H) 10/01/2024    CREATININE 10.16 (H) 12/05/2024    GFRESTIMATE 6 (L) 12/05/2024    MALBCR 3,709.0 (A) 09/01/2023   Checking with Freestyle Fanta 3 personal CGM, stopped using as insurance stopped covering  Aware of s/sx of hypoglycemia and how to treat: Yes  In Office:  mg/dL FPG not testing   Dilated eye exam: last documented: last ordered: 03/06/2023;not up to date, due now   Currently taking Humalog 3 units twice daily and Lantus 8 units nightly  Decrease to Lantus to 4 unit daily  Will send in RX for Dexcom G7 as insurance no longer covers Fanta 3    HTN  Goal BP: <130/80 mmHg (2017 ACC/AHA Guidelines)   Lab Results   Component Value Date    POTASSIUM 4.9 12/05/2024    CREATININE 10.16 (H) 12/05/2024    GFRESTIMATE 6 (L) 12/05/2024     Microalbumin/ Creatinine Ratio (mg/g)   Date Value   09/01/2023 3,709.0 (A)     Note: Patient came right from dialysis.  Has been hypotensive post-dialysis and is now stable without medications.   SMBP: per patient  Checking 0 time(s) a day   Home BP: NA mmHg not testing  Patient taking valsartan  160 mg daily (not taking), amlodipine 10 mg daily (not taking), carvedilol 12.5 mg twice daily   CPM as BP has been running low  Monitor BP and HR each visit  Repeat CMP 10/2024    Lipids/Primary Prevention  ASCVD Risk Group: age 40-75 years with T2DM and LDL-C > 70 mg/dL  10-year ASCVD Risk = The ASCVD Risk score (Didier RASMUSSEN, et al., 2019) failed to calculate for the following reasons:    The 2019 ASCVD risk score is only valid for ages 40 to 79    The patient has a prior MI or stroke diagnosis   Lab Results   Component Value Date    CHOLESTEROL 125 12/28/2024    HDL 64 12/28/2024    CALCLDL 43 12/28/2024    TRIGLYCERIDE 92 12/28/2024   Patient currently taking Eliquis 2.5 mg BID and pravastatin 20 mg nightly  CPM  Repeat lipid panel 12/2024    Counseling provided at this visit:  Instructed patient to check FPG and 2hPPG after at least one meal and Eat a balanced diet that includes fruits, vegetables and whole grains    Labs due:  A1C every 3 months, CMP every 3 months, Lipids  10/2025 , Microalbumin every 6 months    Follow-up:   PharmD follow up: 04/23/25 with PCP     Time Spent on Visit:  30 minutes were spent via face-to-face discussion related to the medical management of the patient.    Ruchi Panda PharmD  Ambulatory Pharmacy Specialist - Chronic Disease Management  [facility]        No

## 2025-01-16 NOTE — ED ADULT TRIAGE NOTE - TEMPERATURE IN CELSIUS (DEGREES C)
36.3
Partially impaired: cannot see medication labels or newsprint, but can see obstacles in path, and the surrounding layout; can count fingers at arm's length

## 2025-01-24 NOTE — ED PROVIDER NOTE - CCCP TRG CHIEF CMPLNT
Requesting Refills:     Insulin (Novalog 70/30 Flex Pen)  Inject 55 units into the skin with breakfast and 45 units every evening with dinner  #15 mL with 1 refill last filled 12/26/2024    Losartan-hctz 100-25 mg per tab  Take 1 tab by mouth daily  #90 with 1 refill last filled 8/21/2024    Last OV:   8/21/2024 - MWV   Plan: Return to the clinic in 6 months for medication check and have fasting blood work done 4-5 days prior to that visit   Next OV:    2/24/2025    Established care with Endocrinology on 1/20/2025  -Patient started on Ozempic at that time  -F/U with endocrinology 2/24/2025    Losartan/hctz refilled.     Insulin routed to Endocrinology to address refills.              
urinary catheter complications

## 2025-04-02 NOTE — ED PROVIDER NOTE - RESPIRATORY NEGATIVE STATEMENT, MLM
FRANCISCO Dorman CNP-Pt is requesting the prescriptions below be sent to alternate pharmacy due to insurance. Pended correct pharmacy.     Routed to PCP    Sonja ALLISON RN   Clinic RN  [facility]     no chest pain, no cough, and no shortness of breath.

## 2025-04-15 NOTE — ED PROVIDER NOTE - PSYCHIATRIC NEGATIVE STATEMENT, MLM
Excellent BP control today and per home readings  Continue carvedilol 6.25 mg BID and losartan 25 mg daily.   no known mental health issues.

## 2025-04-17 NOTE — ED PROVIDER NOTE - CARE PLAN
1003 - Pt to PACU 1 from OR. Bedside report from anesthesiologist and RN. Attached to monitoring, VSS, breathing is calm and unlabored.  Device in place. 2L NC, weir catheter in place, clamped.     1011 - Taken to Radiation Oncology with RN escort. 2L NC. Belongings remain here in [facility] tower locker.      Principal Discharge DX:	Calf pain  Secondary Diagnosis:	Inguinal hernia  Secondary Diagnosis:	Hand pain, left

## 2025-07-23 ENCOUNTER — EMERGENCY (EMERGENCY)
Age: 85
LOS: 1 days | End: 2025-07-23
Attending: EMERGENCY MEDICINE | Admitting: EMERGENCY MEDICINE
Payer: MEDICARE

## 2025-07-23 VITALS
OXYGEN SATURATION: 96 % | RESPIRATION RATE: 18 BRPM | DIASTOLIC BLOOD PRESSURE: 70 MMHG | TEMPERATURE: 98 F | SYSTOLIC BLOOD PRESSURE: 148 MMHG | HEART RATE: 80 BPM

## 2025-07-23 DIAGNOSIS — Z95.0 PRESENCE OF CARDIAC PACEMAKER: Chronic | ICD-10-CM

## 2025-07-23 PROCEDURE — 99284 EMERGENCY DEPT VISIT MOD MDM: CPT

## 2025-07-25 DIAGNOSIS — R53.83 OTHER FATIGUE: ICD-10-CM

## 2025-07-25 DIAGNOSIS — Z95.0 PRESENCE OF CARDIAC PACEMAKER: ICD-10-CM

## 2025-07-25 DIAGNOSIS — Z88.1 ALLERGY STATUS TO OTHER ANTIBIOTIC AGENTS: ICD-10-CM

## 2025-07-25 DIAGNOSIS — I50.9 HEART FAILURE, UNSPECIFIED: ICD-10-CM
